# Patient Record
Sex: FEMALE | Race: WHITE | NOT HISPANIC OR LATINO | Employment: FULL TIME | URBAN - METROPOLITAN AREA
[De-identification: names, ages, dates, MRNs, and addresses within clinical notes are randomized per-mention and may not be internally consistent; named-entity substitution may affect disease eponyms.]

---

## 2023-10-26 PROBLEM — R31.29 MICROSCOPIC HEMATURIA: Status: ACTIVE | Noted: 2022-06-29

## 2023-10-26 PROBLEM — J18.9 PNEUMONIA: Status: ACTIVE | Noted: 2023-10-26

## 2023-10-26 PROBLEM — E78.2 MIXED HYPERLIPIDEMIA: Status: ACTIVE | Noted: 2018-06-17

## 2023-10-26 PROBLEM — E66.9 OBESITY: Status: ACTIVE | Noted: 2018-06-17

## 2023-10-26 PROBLEM — E03.9 HYPOTHYROIDISM: Status: ACTIVE | Noted: 2018-06-17

## 2023-10-26 PROBLEM — M54.10 RADICULOPATHY: Status: ACTIVE | Noted: 2020-08-20

## 2023-10-26 PROBLEM — B00.1 RECURRENT COLD SORES: Status: ACTIVE | Noted: 2022-10-26

## 2023-10-26 PROBLEM — R20.2 PARESTHESIA: Status: ACTIVE | Noted: 2023-10-26

## 2023-10-26 PROBLEM — F32.A DEPRESSION: Status: ACTIVE | Noted: 2019-10-01

## 2023-10-26 PROBLEM — M54.31 RIGHT SIDED SCIATICA: Status: ACTIVE | Noted: 2018-09-19

## 2023-10-26 PROBLEM — S90.229A SUBUNGUAL HEMATOMA OF TOE: Status: ACTIVE | Noted: 2019-10-01

## 2023-10-26 PROBLEM — R73.01 IMPAIRED FASTING GLUCOSE: Status: ACTIVE | Noted: 2022-12-14

## 2023-10-26 PROBLEM — E55.9 VITAMIN D DEFICIENCY, UNSPECIFIED: Status: ACTIVE | Noted: 2019-01-07

## 2023-10-26 PROBLEM — J01.00 ACUTE MAXILLARY SINUSITIS: Status: ACTIVE | Noted: 2019-03-26

## 2023-10-26 PROBLEM — R74.8 ELEVATED LIVER ENZYMES: Status: ACTIVE | Noted: 2019-09-20

## 2023-10-26 PROBLEM — M54.16 LUMBAR BACK PAIN WITH RADICULOPATHY AFFECTING RIGHT LOWER EXTREMITY: Status: ACTIVE | Noted: 2021-06-28

## 2023-10-26 PROBLEM — N39.3 SUI (STRESS URINARY INCONTINENCE, FEMALE): Status: ACTIVE | Noted: 2023-10-26

## 2023-10-26 PROBLEM — M17.12 PRIMARY OSTEOARTHRITIS OF LEFT KNEE: Status: ACTIVE | Noted: 2023-10-26

## 2023-10-26 PROBLEM — G47.19 EXCESSIVE DAYTIME SLEEPINESS: Status: ACTIVE | Noted: 2019-06-13

## 2023-10-26 PROBLEM — M25.512 LEFT SHOULDER PAIN: Status: ACTIVE | Noted: 2020-08-20

## 2023-10-26 PROBLEM — R07.89 ATYPICAL CHEST PAIN: Status: ACTIVE | Noted: 2021-06-28

## 2023-10-26 PROBLEM — G56.03 BILATERAL CARPAL TUNNEL SYNDROME: Status: ACTIVE | Noted: 2023-10-26

## 2023-10-26 RX ORDER — BUPROPION HYDROCHLORIDE 150 MG/1
150 TABLET ORAL DAILY
COMMUNITY
End: 2023-12-26 | Stop reason: SDUPTHER

## 2023-10-26 RX ORDER — SPIRONOLACTONE 50 MG/1
50 TABLET, FILM COATED ORAL 2 TIMES DAILY
COMMUNITY
Start: 2022-12-24

## 2023-10-26 RX ORDER — ATORVASTATIN CALCIUM 10 MG/1
10 TABLET, FILM COATED ORAL NIGHTLY
COMMUNITY
End: 2023-12-26 | Stop reason: SDUPTHER

## 2023-10-26 RX ORDER — FLUTICASONE PROPIONATE 50 MCG
2 SPRAY, SUSPENSION (ML) NASAL DAILY
COMMUNITY
Start: 2023-06-30

## 2023-10-26 RX ORDER — LEVOTHYROXINE SODIUM 25 UG/1
25 TABLET ORAL
COMMUNITY

## 2023-10-26 RX ORDER — METFORMIN HYDROCHLORIDE 500 MG/1
500 TABLET ORAL EVERY 24 HOURS
COMMUNITY

## 2023-10-26 RX ORDER — LEVOTHYROXINE SODIUM 50 UG/1
50 TABLET ORAL DAILY
COMMUNITY
End: 2023-12-26 | Stop reason: SDUPTHER

## 2023-10-26 RX ORDER — HYDROCORTISONE 25 MG/G
1 CREAM TOPICAL 2 TIMES DAILY
COMMUNITY
Start: 2022-06-15

## 2023-10-27 ENCOUNTER — LAB (OUTPATIENT)
Dept: LAB | Facility: LAB | Age: 33
End: 2023-10-27
Payer: MEDICAID

## 2023-10-27 ENCOUNTER — OFFICE VISIT (OUTPATIENT)
Dept: PRIMARY CARE | Facility: CLINIC | Age: 33
End: 2023-10-27
Payer: MEDICAID

## 2023-10-27 VITALS
DIASTOLIC BLOOD PRESSURE: 80 MMHG | TEMPERATURE: 97.3 F | BODY MASS INDEX: 52.62 KG/M2 | SYSTOLIC BLOOD PRESSURE: 126 MMHG | WEIGHT: 293 LBS | HEART RATE: 78 BPM | OXYGEN SATURATION: 98 %

## 2023-10-27 DIAGNOSIS — T78.1XXA ADVERSE FOOD REACTION, INITIAL ENCOUNTER: ICD-10-CM

## 2023-10-27 DIAGNOSIS — T78.1XXA ADVERSE FOOD REACTION, INITIAL ENCOUNTER: Primary | ICD-10-CM

## 2023-10-27 PROCEDURE — 86003 ALLG SPEC IGE CRUDE XTRC EA: CPT

## 2023-10-27 PROCEDURE — 1036F TOBACCO NON-USER: CPT | Performed by: NURSE PRACTITIONER

## 2023-10-27 PROCEDURE — 36415 COLL VENOUS BLD VENIPUNCTURE: CPT

## 2023-10-27 PROCEDURE — 99213 OFFICE O/P EST LOW 20 MIN: CPT | Performed by: NURSE PRACTITIONER

## 2023-10-27 NOTE — PATIENT INSTRUCTIONS
Thank you for choosing our office for your healthcare needs    Blood work is ordered to check for food allergies  You may still need to see an allergist.  Await results    Please avoid shellfish/crab products  Keep Benadryl on hand for use should reaction occur  If you feel like your throat is closing, Have tongue swelling or have difficulty breathing, Call 911 to go to ER

## 2023-10-27 NOTE — PROGRESS NOTES
Subjective   Patient ID: Usha Stevens is a 33 y.o. female who presents for Allergic Reaction (Few weeks ago went out and had crab. Face, lips, hands swelled. Felt like something was in throat.).    MICHELLE Kerr is a 33-year-old female who presents with food allergy concerns  Patient states she was eating crab approximately 2 to 3 weeks ago when she developed facial swelling, throat and tongue swelling, lip swelling and rash to her chest where crab touched her skin.She felt like something was stuck in her throat.   Patient states she went home and took a Benadryl with resolution of symptoms  The symptoms have not reoccurred since this event    She does not Eat crab often but has ate it in the past with no reactions.  Patient denies Known food allergies    Review of Systems  Review of systems negative today    Objective   /80   Pulse 78   Temp 36.3 °C (97.3 °F)   Wt 148 kg (326 lb)   SpO2 98%   BMI 52.62 kg/m²     Physical Exam  Alert and oriented x3, no acute distress  Oropharynx clear with moist mucous membranes. No drooling  Neck supple  Lungs clear to auscultation.  Breathing unlabored.  No stridor or wheezing.Speaks complete sentences  Heart with regular rate and rhythm  Skin warm and dry without rash  Neuro grossly intact    Assessment/Plan   Diagnoses and all orders for this visit:  Adverse food reaction, initial encounter  -     Food Allergy Profile IgE; Future  Patient with likely allergic reaction to crab  We will check food allergy panel  Advised patient she may still need to see allergist  She is instructed to avoid shellfish/crab containing products  Keep Benadryl with her at all times and take immediately should reaction occur  Await results for further follow-up

## 2023-10-28 LAB
CLAM IGE QN: <0.1 KU/L
CODFISH IGE QN: <0.1 KU/L
CORN IGE QN: <0.1
EGG WHITE IGE QN: 0.16 KU/L
MILK IGE QN: 0.14 KU/L
PEANUT IGE QN: <0.1 KU/L
SCALLOP IGE QN: <0.1 KU/L
SESAME SEED IGE QN: <0.1 KU/L
SHRIMP IGE QN: 0.13 KU/L
SOYBEAN IGE QN: <0.1 KU/L
WALNUT IGE QN: <0.1 KU/L
WHEAT IGE QN: <0.1 KU/L

## 2023-12-26 ENCOUNTER — TELEPHONE (OUTPATIENT)
Dept: PRIMARY CARE | Facility: CLINIC | Age: 33
End: 2023-12-26
Payer: MEDICAID

## 2023-12-26 DIAGNOSIS — E03.9 HYPOTHYROIDISM, UNSPECIFIED TYPE: ICD-10-CM

## 2023-12-26 DIAGNOSIS — F32.A DEPRESSION, UNSPECIFIED DEPRESSION TYPE: ICD-10-CM

## 2023-12-26 DIAGNOSIS — E78.2 MIXED HYPERLIPIDEMIA: ICD-10-CM

## 2023-12-26 RX ORDER — LEVOTHYROXINE SODIUM 50 UG/1
50 TABLET ORAL DAILY
Qty: 90 TABLET | Refills: 1 | Status: SHIPPED | OUTPATIENT
Start: 2023-12-26 | End: 2024-06-23

## 2023-12-26 RX ORDER — ATORVASTATIN CALCIUM 10 MG/1
10 TABLET, FILM COATED ORAL NIGHTLY
Qty: 90 TABLET | Refills: 1 | Status: SHIPPED | OUTPATIENT
Start: 2023-12-26 | End: 2024-06-23

## 2023-12-26 RX ORDER — BUPROPION HYDROCHLORIDE 150 MG/1
150 TABLET ORAL DAILY
Qty: 90 TABLET | Refills: 1 | Status: SHIPPED | OUTPATIENT
Start: 2023-12-26 | End: 2024-06-23

## 2023-12-26 NOTE — TELEPHONE ENCOUNTER
Patient needs refills on the following    Lipitor 10  Synthroid 50mcg  Wellbutril 150mg    [facility] in [place]

## 2024-01-15 ENCOUNTER — ANCILLARY PROCEDURE (OUTPATIENT)
Dept: RADIOLOGY | Facility: CLINIC | Age: 34
End: 2024-01-15
Payer: MEDICAID

## 2024-01-15 ENCOUNTER — OFFICE VISIT (OUTPATIENT)
Dept: PRIMARY CARE | Facility: CLINIC | Age: 34
End: 2024-01-15
Payer: MEDICAID

## 2024-01-15 VITALS
HEART RATE: 88 BPM | WEIGHT: 293 LBS | DIASTOLIC BLOOD PRESSURE: 80 MMHG | BODY MASS INDEX: 54.88 KG/M2 | TEMPERATURE: 97.8 F | SYSTOLIC BLOOD PRESSURE: 132 MMHG | OXYGEN SATURATION: 98 %

## 2024-01-15 DIAGNOSIS — J20.9 SUBACUTE BRONCHITIS: ICD-10-CM

## 2024-01-15 DIAGNOSIS — R05.3 PERSISTENT COUGH: Primary | ICD-10-CM

## 2024-01-15 DIAGNOSIS — R05.3 PERSISTENT COUGH: ICD-10-CM

## 2024-01-15 PROCEDURE — 71046 X-RAY EXAM CHEST 2 VIEWS: CPT

## 2024-01-15 PROCEDURE — 99213 OFFICE O/P EST LOW 20 MIN: CPT | Performed by: NURSE PRACTITIONER

## 2024-01-15 PROCEDURE — 1036F TOBACCO NON-USER: CPT | Performed by: NURSE PRACTITIONER

## 2024-01-15 RX ORDER — AMOXICILLIN 875 MG/1
875 TABLET, FILM COATED ORAL 2 TIMES DAILY
Qty: 20 TABLET | Refills: 0 | Status: SHIPPED | OUTPATIENT
Start: 2024-01-15 | End: 2024-01-25

## 2024-01-15 RX ORDER — ALBUTEROL SULFATE 90 UG/1
2 AEROSOL, METERED RESPIRATORY (INHALATION) EVERY 6 HOURS PRN
Qty: 8 G | Refills: 5 | Status: SHIPPED | OUTPATIENT
Start: 2024-01-15 | End: 2025-01-14

## 2024-01-15 ASSESSMENT — ENCOUNTER SYMPTOMS
SINUS PAIN: 0
COUGH: 1
WHEEZING: 0
SINUS PRESSURE: 0
NAUSEA: 0
SORE THROAT: 0
SHORTNESS OF BREATH: 0
CHILLS: 0
FEVER: 0
VOMITING: 0
RHINORRHEA: 0
DIARRHEA: 0

## 2024-01-15 NOTE — PROGRESS NOTES
Subjective   Patient ID: Usha Stevens is a 33 y.o. female who presents for Cough (Congestion. Off/on since November.).    Cough  Associated symptoms include chest pain, ear pain and postnasal drip. Pertinent negatives include no chills, fever, rash, rhinorrhea, sore throat, shortness of breath or wheezing.      Pt has had cough on and off since November  Has been seen in [facility] and given tessalon and prednisone with no relief  Claritin, mucinex, dayquil/nyquil  No imaging  Nonsmoker  No covid19 testing    Review of Systems   Constitutional:  Negative for chills and fever.   HENT:  Positive for congestion, ear pain, postnasal drip and sneezing. Negative for rhinorrhea, sinus pressure, sinus pain and sore throat.    Respiratory:  Positive for cough. Negative for shortness of breath and wheezing.    Cardiovascular:  Positive for chest pain.   Gastrointestinal:  Negative for diarrhea, nausea and vomiting.   Skin:  Negative for rash.       Objective   /80   Pulse 88   Temp 36.6 °C (97.8 °F)   Wt (!) 154 kg (340 lb)   SpO2 98%   BMI 54.88 kg/m²     Physical Exam  Gen: A/O x3 NAD  Eyes: WNL  ENT: Bilat TM dull. Auditory canals wnl. Bilat nares pale/boggy. + Posterior pharynx erythema. No exudate. Uvula midline. No sinus tenderness.  Lungs: Breath sounds clear with harsh cough. No wheeze. Speaks complete sentences/Unlabored.  Heart: RRR, no murmur  Neck: supple, no adenopathy  Abd: soft NT/ND, obese  Skin: warm/dry, no rash  Neuro: grossly intact    Assessment/Plan   Diagnoses and all orders for this visit:  Persistent cough  -     amoxicillin (Amoxil) 875 mg tablet; Take 1 tablet (875 mg) by mouth 2 times a day for 10 days.  -     albuterol (Ventolin HFA) 90 mcg/actuation inhaler; Inhale 2 puffs every 6 hours if needed for wheezing or shortness of breath (cough).  -     XR chest 2 views; Future  Subacute bronchitis    Medications as prescribed  Antibiotic due to length of sx  Differentials include   Cxr  ordered  Fluids, rest, humidifier  Flonase, claritin  Follow up if not improving 2 weeks

## 2024-02-07 ENCOUNTER — APPOINTMENT (OUTPATIENT)
Dept: ALLERGY | Facility: HOSPITAL | Age: 34
End: 2024-02-07
Payer: MEDICAID

## 2024-02-21 ENCOUNTER — APPOINTMENT (OUTPATIENT)
Dept: ALLERGY | Facility: HOSPITAL | Age: 34
End: 2024-02-21
Payer: MEDICAID

## 2024-02-21 ENCOUNTER — CONSULT (OUTPATIENT)
Dept: ALLERGY | Facility: HOSPITAL | Age: 34
End: 2024-02-21
Payer: MEDICAID

## 2024-02-21 VITALS
HEART RATE: 79 BPM | HEIGHT: 66 IN | TEMPERATURE: 98.3 F | BODY MASS INDEX: 47.09 KG/M2 | SYSTOLIC BLOOD PRESSURE: 131 MMHG | DIASTOLIC BLOOD PRESSURE: 82 MMHG | WEIGHT: 293 LBS

## 2024-02-21 DIAGNOSIS — J31.0 CHRONIC RHINITIS: ICD-10-CM

## 2024-02-21 DIAGNOSIS — T78.1XXA ADVERSE FOOD REACTION, INITIAL ENCOUNTER: Primary | ICD-10-CM

## 2024-02-21 PROCEDURE — 99214 OFFICE O/P EST MOD 30 MIN: CPT | Performed by: ALLERGY & IMMUNOLOGY

## 2024-02-21 PROCEDURE — 99204 OFFICE O/P NEW MOD 45 MIN: CPT | Performed by: ALLERGY & IMMUNOLOGY

## 2024-02-21 RX ORDER — AZITHROMYCIN 250 MG/1
TABLET, FILM COATED ORAL
COMMUNITY
Start: 2019-12-25

## 2024-02-21 RX ORDER — NORETHINDRONE ACETATE AND ETHINYL ESTRADIOL 1.5-30(21)
1 KIT ORAL
COMMUNITY
Start: 2011-07-25

## 2024-02-21 RX ORDER — FENOFIBRATE 145 MG/1
145 TABLET, FILM COATED ORAL
COMMUNITY

## 2024-02-21 RX ORDER — PREDNISONE 20 MG/1
40 TABLET ORAL DAILY
COMMUNITY
Start: 2023-12-05 | End: 2023-12-10

## 2024-02-21 RX ORDER — BENZONATATE 200 MG/1
CAPSULE ORAL
COMMUNITY
Start: 2023-12-05

## 2024-02-21 RX ORDER — METHOCARBAMOL 750 MG/1
750 TABLET, FILM COATED ORAL 4 TIMES DAILY
COMMUNITY
Start: 2019-12-25

## 2024-02-21 RX ORDER — DEXTROMETHORPHAN HBR. AND GUAIFENESIN 10; 100 MG/5ML; MG/5ML
5 SOLUTION ORAL 2 TIMES DAILY
COMMUNITY
Start: 2019-12-25

## 2024-02-21 RX ORDER — IBUPROFEN 800 MG/1
800 TABLET ORAL EVERY 6 HOURS PRN
COMMUNITY
Start: 2019-12-25

## 2024-02-21 ASSESSMENT — ENCOUNTER SYMPTOMS
CARDIOVASCULAR NEGATIVE: 1
RESPIRATORY NEGATIVE: 1
GASTROINTESTINAL NEGATIVE: 1
ALLERGIC/IMMUNOLOGIC NEGATIVE: 1
MUSCULOSKELETAL NEGATIVE: 1
HEMATOLOGIC/LYMPHATIC NEGATIVE: 1
EYES NEGATIVE: 1
CONSTITUTIONAL NEGATIVE: 1

## 2024-02-21 NOTE — PATIENT INSTRUCTIONS
It was nice to meet you today.    Please avoid all antihistamines for at least 7 days prior to next visit for allergy testing

## 2024-02-21 NOTE — PROGRESS NOTES
Usha Stevens presents for initial evaluation today.      Usha Stevens was seen at the request of ROMAN Dior for a chief complaint of concern for allergy; a report with my findings is being sent via written or electronic means to ROMAN Dior with my recommendations for treatment    She provides the following history:    She went out to dinner for her birthday and had one pound of crab.  She noted palms of her hands were itching correction through eating the crab and then she developed lip swelling and throat tightness.  She took Benadryl when she got home.  She notes that symptoms improved within an hour.  She denies drinking any alcohol with the meal.  She otherwise had potatoes, bread, and salad.      She last ate crab in March 2023 without issue.  Since then she has eaten lobster and shrimp.  She notes that eating lobster caused facial itching, so she stopped eating it.  She tolerated shrimp as recently as last week with no problem (peel and eat).    She tolerates finned fish with no problem.  Eats egg, milk, wheat, soy, peanut, tree nuts, sesame with no problem.      Rhinitis: She reports itchy eyes, runny nose, sneezing, nasal congestion during summer and when around pine [place] trees.  She takes Claritin daily, and has Flonase but does not use it regularly.      Asthma: Diagnosed with asthma in 7th grade with exercise-induced.  Albuterol prn.  Uses albuterol couple times a year at most.     Eczema: Denies    Venom allergy:  Denies     Drug allergy: Denies     Environmental History:  Type of home:  House  Pets in the house: Cat (7)   Mold or moisture in the home: None  Bedroom lisette: Hardwood  Dust mite covers on bed:  No  Cigarette exposure in the home:  No  Occupation/School: works as medical transport advisor, works in office, older building     Pertinent Allergy/Immunology family history:  Mom with shellfish and seasonal allergies     Review of Systems  "  Constitutional: Negative.    HENT: Negative.     Eyes: Negative.    Respiratory: Negative.     Cardiovascular: Negative.    Gastrointestinal: Negative.    Musculoskeletal: Negative.    Skin: Negative.    Allergic/Immunologic: Negative.    Hematological: Negative.        Vital signs:  /82 (BP Location: Right arm, Patient Position: Sitting)   Pulse 79   Temp 36.8 °C (98.3 °F) (Oral)   Ht 1.676 m (5' 5.98\")   Wt (!) 152 kg (335 lb 8.6 oz)   BMI 54.18 kg/m²     Physical Exam:  GENERAL: Alert, oriented and in no acute distress.     HEENT: EYES: No conjunctival injection or cobblestoning. Nose: nasal turbinates mildly edematous and are not boggy.  There is no mucous stranding, polyps, or blood    noted. EARS: Tympanic membranes are clear. MOUTH: moist and pink with no exudates, ulcers, or thrush. NECK: is supple, without adenopathy.  No upper airway stridor noted.       HEART: regular rate and rhythm.       LUNGS: Clear to auscultation bilaterally. No wheezing, rhonchi or rales.        ABDOMEN: Positive bowel sounds, soft, nontender, nondistended.       EXTREMITIES: No clubbing or edema.        NEURO:  Normal affect.  Gait normal.      SKIN: No rash, hives, or angioedema noted      Impression:  1. Adverse food reaction, initial encounter    2. Chronic rhinitis      Assessment and Plan:  Adverse reaction to food, initial encounter: Recent clinical reaction to crab and lobster.  She had a food allergy profile that showed equivocal IgE to egg, milk, shrimp which she tolerates with no problem.  We discussed that positive food allergy specific IgE only indicates sensitization, not clinical food allergy.  She tolerates ingesting these foods without issue making food allergy unlikely to egg, milk, shrimp.  Will further evaluate for other shellfish allergy given reaction to crab and lobster (which was not assessed on food allergy IgE panel).  She will return for allergy testing off antihistamines in 1 " week.    Chronic rhinitis: We will further evaluate with environmental allergy skin prick testing.  Will make further recommendations pending results of testing.

## 2024-02-28 ENCOUNTER — OFFICE VISIT (OUTPATIENT)
Dept: ALLERGY | Facility: HOSPITAL | Age: 34
End: 2024-02-28
Payer: MEDICAID

## 2024-02-28 ENCOUNTER — LAB (OUTPATIENT)
Dept: LAB | Facility: LAB | Age: 34
End: 2024-02-28
Payer: MEDICAID

## 2024-02-28 VITALS
TEMPERATURE: 98.5 F | WEIGHT: 293 LBS | BODY MASS INDEX: 47.09 KG/M2 | HEIGHT: 66 IN | HEART RATE: 82 BPM | SYSTOLIC BLOOD PRESSURE: 134 MMHG | DIASTOLIC BLOOD PRESSURE: 85 MMHG

## 2024-02-28 DIAGNOSIS — T78.1XXD ADVERSE FOOD REACTION, SUBSEQUENT ENCOUNTER: ICD-10-CM

## 2024-02-28 DIAGNOSIS — J30.89 ALLERGIC RHINITIS DUE TO DUST MITE: ICD-10-CM

## 2024-02-28 DIAGNOSIS — J30.81 ALLERGIC RHINITIS DUE TO ANIMAL DANDER: ICD-10-CM

## 2024-02-28 DIAGNOSIS — J30.1 SEASONAL ALLERGIC RHINITIS DUE TO POLLEN: ICD-10-CM

## 2024-02-28 DIAGNOSIS — T78.1XXD ADVERSE FOOD REACTION, SUBSEQUENT ENCOUNTER: Primary | ICD-10-CM

## 2024-02-28 PROCEDURE — 82785 ASSAY OF IGE: CPT

## 2024-02-28 PROCEDURE — 95004 PERQ TESTS W/ALRGNC XTRCS: CPT | Performed by: ALLERGY & IMMUNOLOGY

## 2024-02-28 PROCEDURE — 86003 ALLG SPEC IGE CRUDE XTRC EA: CPT

## 2024-02-28 PROCEDURE — 36415 COLL VENOUS BLD VENIPUNCTURE: CPT

## 2024-02-28 PROCEDURE — 1036F TOBACCO NON-USER: CPT | Performed by: ALLERGY & IMMUNOLOGY

## 2024-02-28 PROCEDURE — PERCT PERCUT ALLERGY SKIN TEST: Performed by: ALLERGY & IMMUNOLOGY

## 2024-02-28 RX ORDER — CETIRIZINE HYDROCHLORIDE 10 MG/1
10 TABLET ORAL DAILY PRN
Qty: 30 TABLET | Refills: 11 | Status: SHIPPED | OUTPATIENT
Start: 2024-02-28 | End: 2025-02-27

## 2024-02-28 RX ORDER — FLUTICASONE PROPIONATE 50 MCG
2 SPRAY, SUSPENSION (ML) NASAL DAILY
Qty: 16 G | Refills: 11 | Status: SHIPPED | OUTPATIENT
Start: 2024-02-28 | End: 2025-02-27

## 2024-02-28 ASSESSMENT — ENCOUNTER SYMPTOMS
OCCASIONAL FEELINGS OF UNSTEADINESS: 0
DEPRESSION: 0
ALLERGIC/IMMUNOLOGIC NEGATIVE: 1
LOSS OF SENSATION IN FEET: 0
EYES NEGATIVE: 1
GASTROINTESTINAL NEGATIVE: 1
CARDIOVASCULAR NEGATIVE: 1
CONSTITUTIONAL NEGATIVE: 1
MUSCULOSKELETAL NEGATIVE: 1
RESPIRATORY NEGATIVE: 1
HEMATOLOGIC/LYMPHATIC NEGATIVE: 1

## 2024-02-28 NOTE — PATIENT INSTRUCTIONS
Your allergy testing was positive to tree and weed pollen, cat, mold, dust mite    Environmental Allergy Testing:  Test Results (wheal/flare in mm)    Controls  Controls  Histamine:   Negative: 0/0    Trees:  Trees  American Beech: 0/0  Julito: 0/0  Birch: 0/0  Black [place]: 0/0  [place]: 3/5  [place]: 4/5  [place]: 0/0  Elm:   [place]: /5  Maple: 0/0  [place]: 0/0  [place]: 0/0  [place]: 0/0  White poplar: 0/0     Grasses:  Grass  [place]: 0/0  Bermuda: 0/0  [place]: 0/0  KORT Grass Mix: 0/0  Sweet Vernal: 0/0    Weeds:  Weeds  Cocklebur: 0/0  Dandelion: 0/0  English Plantain: 0/0  Goldenrod: 5/10  Lambs Quarters: 0/0  Mugwort:   Pigweed: 0/0  Ragweed Mix: 0/0  Russian Thistle: 0/0  Yellow Dock: 0/0     Molds:  Molds  Alternaria: 0/0  Aspergillus: 0/0  Cladosporium: 0/0  Helminthosporium: 0/0  Penicillium: 3/5  Stemphyllium: 3/5    Animals/Dust Mite:  Animals  Cat: 3/5  AP do/0  Dasilva Do/0  Mouse: 0/0  Cockroach: 0/0  Dust Mite F: 4/10  Dust Mite P: 3/20     Food Allergy Test Results  Shellfish  Clam: 0/0  Crab: 0/0  Lobster: 0/0  Shrimp: 0/0         You may use Flonase 2 sprays each nostril once daily  Technique for nasal spray administration:  First, look slightly down, breathe normally, you do not need to sniff while you spray.  To use the nose spray, place the tip in your nose with the opposite hand (left hand, right side of nose, right hand, left side of nose), and aim or point toward the outside of the nose.  Do not sniff or snort medication afterwards as this can cause most of the medication to be swallowed.  Rather, dab your nose with a tissue if any runs out.    You may use Cetirizine (Zyrtec) 10 mg once daily as needed     Please have labs drawn. Please note that some labs will come back sooner than others.  We will contact you when all labs have returned so that we can discuss results.     We would like to see you in follow up in 6 months or sooner if needed

## 2024-02-28 NOTE — LETTER
February 28, 2024     Patient: Usha Stevens   YOB: 1990   Date of Visit: 2/28/2024       To Whom It May Concern:    Usha Stevens was seen in my clinic on 2/28/2024 at 12:30 pm. Please excuse Usha for her absence from school on this day to make the appointment.    If you have any questions or concerns, please don't hesitate to call.         Sincerely,         Princess MARY Stephens MD        CC:   No Recipients

## 2024-02-28 NOTE — PROGRESS NOTES
"Usha Stevens presents for allergy testing.  She has no new complaints.   She has not had reactions since last visit.  She has 6 cats at home.       Review of Systems   Constitutional: Negative.    HENT: Negative.     Eyes: Negative.    Respiratory: Negative.     Cardiovascular: Negative.    Gastrointestinal: Negative.    Musculoskeletal: Negative.    Skin: Negative.    Allergic/Immunologic: Negative.    Hematological: Negative.        Vital signs:  /85 (BP Location: Right arm, Patient Position: Sitting)   Pulse 82   Temp 36.9 °C (98.5 °F) (Oral)   Ht 1.67 m (5' 5.75\")   Wt (!) 151 kg (332 lb 14.3 oz)   BMI 54.14 kg/m²     Physical Exam:    GENERAL: Alert, oriented and in no acute distress.     LUNGS: No wheezing    SKIN: No rash, hives, or angioedema noted    Environmental Allergy Testing:  Test Results (wheal/flare in mm)    Controls  Controls  Histamine: 30  Negative: 0/0    Trees:  Trees  American Beech: 0/0  Julito: 0/0  Birch: 0/0  Black [place]: 0/0  [place]: 3/5  [place]: 4/5  [place]astern [place]: 0/0  Elm: /20  [place]: 4/5  Maple: 0/0  [place]: 0/0  [place]: 0/0  [place]: 0/0  White poplar: 0/0     Grasses:  Grass  Bahia: 0/0  [place]: 0/0  [place]: 0/0  KORT Grass Mix: 0/0  Sweet Vernal: 0/0    Weeds:  Weeds  Cocklebur: 0/0  Dandelion: 0/0  English Plantain: 0/0  Goldenrod: 5/10  Lambs Quarters: 0/0  Mugwort: 4/20  Pigweed: 0/0  Ragweed Mix: 0/0  Russian Thistle: 0/0  Yellow Dock: 0/0     Molds:  Molds  Alternaria: 0/0  Aspergillus: 0/0  Cladosporium: 0/0  Helminthosporium: 0/0  Penicillium: 3/5  Stemphyllium: 3/5    Animals/Dust Mite:  Animals  Cat: 3/5  AP do/0  Dasilva Do/0  Mouse: 0/0  Cockroach: 0/0  Dust Mite F: 4/10  Dust Mite P: 3/20     Food Allergy Test Results  Shellfish  Clam: 0/0  Crab: 0/0  Lobster: 0/0  Shrimp: 0/0         Impression:  1. Adverse food reaction, subsequent encounter    2. Seasonal allergic rhinitis due to pollen    3. Allergic rhinitis due to dust mite  "   4. Allergic rhinitis due to animal dander        Assessment and Plan:  Adverse reaction to food, subsequent encounter: Recent clinical reaction thought to be secondary to crab and lobster.  Allergy testing to shellfish was negative today.  Will further evaluate with shellfish specific IgE to crab, lobster, mussel, oyster and make further recommendations pending results of testing.  Discussed that dust mite tropomyosin cross-reactivity may be a possible contributor to symptoms.  Recommended if reaction recurs, please keep diary of exposures.     Allergic rhinitis, seasonal and perennial: She was found to have significant sensitivity to tree, weed, dust mite, cat on aeroallergen skin prick testing today.  We discussed treatments for allergic rhinitis to include avoidance, medication, and allergen immunotherapy.  We discussed interventions for dust mite control, and provided  information on dust mite encasements for the bedding.  Recommend Flonase 2 sprays each nostril once daily and cetirizine 10 mg once daily as needed.  We reviewed proper nasal spray administration technique.  If she does not achieve adequate control with medication and/or would like to decrease overall medication use, allergen immunotherapy would be an option to consider in the future.    Plan for follow up in 3 months or sooner if needed

## 2024-02-29 LAB
CRAB IGE QN: <0.1 KU/L
LOBSTER IGE QN: <0.1 KU/L
OYSTER IGE QN: <0.1 KU/L
TOTAL IGE SMQN RAST: 372 KU/L

## 2024-03-02 ENCOUNTER — TELEPHONE (OUTPATIENT)
Dept: ALLERGY | Facility: CLINIC | Age: 34
End: 2024-03-02
Payer: MEDICAID

## 2024-03-02 LAB
ANNOTATION COMMENT IMP: NORMAL
BLUE MUSSEL IGE QN: <0.1 KU/L

## 2024-03-02 NOTE — TELEPHONE ENCOUNTER
Please let patient know that shellfish allergy testing is negative.  If symptoms recur, please keep diary of closures and let us know.

## 2024-03-04 NOTE — TELEPHONE ENCOUNTER
Result Communication    Resulted Orders   Lobster IgE   Result Value Ref Range    Lobster IgE <0.10 <0.10 kU/L    Narrative    Interpretation Scale  <0.10 kU/L - Class 0 Allergen: ABSENT OR UNDETECTABLE ALLERGEN SPECIFIC IgE  0.10-0.34 kU/L - Class 0/1 Allergen: EQUIVOCAL LEVEL OF ALLERGEN SPECIFIC IgE  0.35-0.69 kU/L - Class 1 Allergen: LOW LEVEL OF ALLERGEN SPECIFIC IgE  0.70-3.49 kU/L - Class 2 Allergen: MODERATE LEVEL OF ALLERGEN SPECIFIC IgE  3.50-17.49 kU/L - Class 3 Allergen: HIGH LEVEL OF ALLERGEN SPECIFIC IgE  17.50-49.99 kU/L - Class 4 Allergen: VERY HIGH LEVEL OF ALLERGEN SPECIFIC IgE  50..00 kU/L - Class 5 Allergen: ULTRA HIGH LEVEL OF ALLERGEN SPECIFIC IgE  >100.00 kU/L - Class 6 Allergen: EXTREMELY HIGH LEVEL OF ALLERGEN SPECIFIC IgE   Crab IgE   Result Value Ref Range    Crab IgE <0.10 <0.10 kU/L    Narrative    Interpretation Scale  <0.10 kU/L - Class 0 Allergen: ABSENT OR UNDETECTABLE ALLERGEN SPECIFIC IgE  0.10-0.34 kU/L - Class 0/1 Allergen: EQUIVOCAL LEVEL OF ALLERGEN SPECIFIC IgE  0.35-0.69 kU/L - Class 1 Allergen: LOW LEVEL OF ALLERGEN SPECIFIC IgE  0.70-3.49 kU/L - Class 2 Allergen: MODERATE LEVEL OF ALLERGEN SPECIFIC IgE  3.50-17.49 kU/L - Class 3 Allergen: HIGH LEVEL OF ALLERGEN SPECIFIC IgE  17.50-49.99 kU/L - Class 4 Allergen: VERY HIGH LEVEL OF ALLERGEN SPECIFIC IgE  50..00 kU/L - Class 5 Allergen: ULTRA HIGH LEVEL OF ALLERGEN SPECIFIC IgE  >100.00 kU/L - Class 6 Allergen: EXTREMELY HIGH LEVEL OF ALLERGEN SPECIFIC IgE   Oyster IgE   Result Value Ref Range    Oyster IgE <0.10 <0.10 kU/L    Narrative    Interpretation Scale  <0.10 kU/L - Class 0 Allergen: ABSENT OR UNDETECTABLE ALLERGEN SPECIFIC IgE  0.10-0.34 kU/L - Class 0/1 Allergen: EQUIVOCAL LEVEL OF ALLERGEN SPECIFIC IgE  0.35-0.69 kU/L - Class 1 Allergen: LOW LEVEL OF ALLERGEN SPECIFIC IgE  0.70-3.49 kU/L - Class 2 Allergen: MODERATE LEVEL OF ALLERGEN SPECIFIC IgE  3.50-17.49 kU/L - Class 3 Allergen: HIGH LEVEL OF  ALLERGEN SPECIFIC IgE  17.50-49.99 kU/L - Class 4 Allergen: VERY HIGH LEVEL OF ALLERGEN SPECIFIC IgE  50..00 kU/L - Class 5 Allergen: ULTRA HIGH LEVEL OF ALLERGEN SPECIFIC IgE  >100.00 kU/L - Class 6 Allergen: EXTREMELY HIGH LEVEL OF ALLERGEN SPECIFIC IgE   Mussel IgE   Result Value Ref Range    Blue Mussel IgE <0.10 <=0.34 kU/L      Comment:      Performed By: [facility]  [address] [place] [id number]  : Dima Lowry MD, PhD  CLIA Number: [id number]   Immunocap IgE   Result Value Ref Range    Immunocap IgE 372 (H) <=214 KU/L      Comment:      Note: Omalizumab (Xolair, [employer]; humanized  IgG1 antihuman IgE Fc) treatment does not  significantly interfere with the accuracy of  total IgE on the ImmunoCAP (Enmotus) platform.  J Allergy Clin Immunol 2006;117:759-66).  Allergens, parasitic diseases, smoking, and  alcohol consumption have been reported to  increase levels of total IgE in serum.   Allergen Interpretation, Immunocap Score IgE   Result Value Ref Range    Immunocap Interpretation See Note       Comment:      REFERENCE INTERVAL: Allergen, Interpretation     Less than 0.10 kU/L......Class 0.....No significant level detected   0.10-0.34 kU/L...........Class 0/1...Clinical relevance   undetermined   0.35-0.70 kU/L...........Class 1.....Low   0.71-3.50 kU/L...........Class 2.....Moderate   3.51-17.50 kU/L..........Class 3.....High   17.51-50.00 kU/L.........Class 4.....Very High   50..00 kU/L........Class 5.....Very High   Greater than 100.00kU/L..Class 6.....Very High    Allergen results of 0.10-0.34 kU/L are intended for specialist use   as the clinical relevance is undetermined. Even though increasing   ranges are reflective of increasing concentrations of   allergen-specific IgE, these concentrations may not correlate with   the degree of clinical response or skin testing results when   challenged with a specific allergen. The correlation of allergy    laboratory results with clinical history and in vivo reactivity to   specific allergens is essential. A negative test may not rule out    clinical allergy or even anaphylaxis.  Performed By: [facility]  [address] [place] [id number]  : Dima Lowry MD, PhD  CLIA Number: [id number]       9:03 AM      Results were successfully communicated with the patient and they acknowledged their understanding.

## 2024-07-03 ENCOUNTER — APPOINTMENT (OUTPATIENT)
Dept: PRIMARY CARE | Facility: CLINIC | Age: 34
End: 2024-07-03
Payer: MEDICAID

## 2024-07-09 ENCOUNTER — OFFICE VISIT (OUTPATIENT)
Dept: OBSTETRICS AND GYNECOLOGY | Facility: CLINIC | Age: 34
End: 2024-07-09
Payer: MEDICAID

## 2024-07-09 VITALS
SYSTOLIC BLOOD PRESSURE: 122 MMHG | DIASTOLIC BLOOD PRESSURE: 88 MMHG | WEIGHT: 293 LBS | HEIGHT: 66 IN | BODY MASS INDEX: 47.09 KG/M2

## 2024-07-09 DIAGNOSIS — Z01.419 WELL WOMAN EXAM WITH ROUTINE GYNECOLOGICAL EXAM: Primary | ICD-10-CM

## 2024-07-09 PROCEDURE — 99395 PREV VISIT EST AGE 18-39: CPT | Performed by: OBSTETRICS & GYNECOLOGY

## 2024-07-09 ASSESSMENT — ENCOUNTER SYMPTOMS
DEPRESSION: 0
OCCASIONAL FEELINGS OF UNSTEADINESS: 0
LOSS OF SENSATION IN FEET: 0

## 2024-07-09 ASSESSMENT — PATIENT HEALTH QUESTIONNAIRE - PHQ9
1. LITTLE INTEREST OR PLEASURE IN DOING THINGS: NOT AT ALL
2. FEELING DOWN, DEPRESSED OR HOPELESS: NOT AT ALL
SUM OF ALL RESPONSES TO PHQ9 QUESTIONS 1 AND 2: 0

## 2024-07-09 ASSESSMENT — PAIN SCALES - GENERAL: PAINLEVEL: 0-NO PAIN

## 2024-07-09 NOTE — PROGRESS NOTES
Subjective   Usha Stevens is a 33 y.o. female who is here for a routine exam. Periods are regular every 28-30 days, lasting a few days. Dysmenorrhea:none. Cyclic symptoms include none. No intermenstrual bleeding, spotting, or discharge.      Last pap:   :neg/neg  Last mammogram  Current contraception: IUD  History of abnormal Pap smear: no  Family history of uterine or ovarian cancer: no  Regular self breast exam: yes  History of abnormal mammogram: no  Family history of breast cancer: no  History of abnormal lipids: no  Menstrual History:  OB History          1    Para   1    Term   1            AB        Living   1         SAB        IAB        Ectopic        Multiple        Live Births                    M  No LMP recorded. Patient has had an implant.         History reviewed. No pertinent past medical history.    History reviewed. No pertinent surgical history.    Social History     Socioeconomic History    Marital status:      Spouse name: Not on file    Number of children: Not on file    Years of education: Not on file    Highest education level: Not on file   Occupational History    Not on file   Tobacco Use    Smoking status: Never    Smokeless tobacco: Never   Vaping Use    Vaping status: Never Used   Substance and Sexual Activity    Alcohol use: Yes    Drug use: Not Currently    Sexual activity: Yes   Other Topics Concern    Not on file   Social History Narrative    Not on file     Social Determinants of Health     Financial Resource Strain: Not on file   Food Insecurity: Not on file   Transportation Needs: Not on file   Physical Activity: Not on file   Stress: Not on file   Social Connections: Not on file   Intimate Partner Violence: Not on file   Housing Stability: Not on file       No family history on file.    Prior to Admission medications    Medication Sig Start Date End Date Taking? Authorizing Provider   albuterol (Ventolin HFA) 90 mcg/actuation inhaler Inhale 2  puffs every 6 hours if needed for wheezing or shortness of breath (cough). 1/15/24 1/14/25  Ernestina G Biddell, APRN-CNP   atorvastatin (Lipitor) 10 mg tablet Take 1 tablet (10 mg) by mouth once daily at bedtime. 12/26/23 6/23/24  Ernestina G Biddell, APRN-CNP   buPROPion XL (Wellbutrin XL) 150 mg 24 hr tablet Take 1 tablet (150 mg) by mouth once daily. 12/26/23 6/23/24  Ernestina G Biddell, APRN-CNP   cetirizine (ZyrTEC) 10 mg tablet Take 1 tablet (10 mg) by mouth once daily as needed for allergies. 2/28/24 2/27/25  Princess MARY Stephens MD   fenofibrate (Tricor) 145 mg tablet Take 1 tablet (145 mg) by mouth once daily.    Historical Provider, MD   fluticasone (Flonase) 50 mcg/actuation nasal spray Administer 2 sprays into affected nostril(s) once daily. 6/30/23   Historical Provider, MD   fluticasone (Flonase) 50 mcg/actuation nasal spray Administer 2 sprays into each nostril once daily. Shake gently. Before first use, prime pump. After use, clean tip and replace cap. 2/28/24 2/27/25  Princess MARY Stephens MD   hydrocortisone (Anusol-HC) 2.5 % rectal cream 1 Application 2 times a day. 6/15/22   Historical Provider, MD   ibuprofen 800 mg tablet Take 1 tablet (800 mg) by mouth every 6 hours if needed. 12/25/19   Historical Provider, MD   levonorgestrel (MIRENA UTRN) intrauteral, once, 0 Refill(s), Type: Maintenance 6/13/18   Historical Provider, MD   levothyroxine (Synthroid, Levoxyl) 25 mcg tablet Take 1 tablet (25 mcg) by mouth once daily in the morning. Take before meals.    Historical Provider, MD   levothyroxine (Synthroid, Levoxyl) 50 mcg tablet Take 1 tablet (50 mcg) by mouth once daily. 12/26/23 6/23/24  Ernestina G Biddell, APRN-CNP   azithromycin (Zithromax) 250 mg tablet TAKE TWO (2) PILLS BY MOUTH THE FIRST DAY AND THEN ONE (1)  PILL BY MOUTH DAILY FOR FOUR (4) DAYS. 12/25/19 7/9/24  Historical Provider, MD   benzonatate (Tessalon) 200 mg capsule Take 1 Capsule (oral) 3 times per day for 7 days 12/5/23 7/9/24   "Historical Provider, MD   dextromethorphan-guaifenesin  mg/5 mL oral liquid Take 5 mL by mouth twice a day. 12/25/19 7/9/24  Historical Provider, MD   metFORMIN (Glucophage) 500 mg tablet Take 1 tablet (500 mg) by mouth once every 24 hours.  7/9/24  Historical Provider, MD   methocarbamol (Robaxin) 750 mg tablet Take 1 tablet (750 mg) by mouth 4 times a day. 12/25/19 7/9/24  Historical Provider, MD   norethindrone-e.estradioL-iron (Microgestin FE 1.5/30) 1.5 mg-30 mcg (21)/75 mg (7) tablet Take 1 tablet by mouth once daily. 7/25/11 7/9/24  Historical Provider, MD   spironolactone (Aldactone) 50 mg tablet Take 1 tablet (50 mg) by mouth 2 times a day. 12/24/22 7/9/24  Historical Provider, MD       Allergies   Allergen Reactions    Citric Acid Unknown              Objective   /88   Ht 1.676 m (5' 6\")   Wt (!) 153 kg (338 lb 3.2 oz)   BMI 54.59 kg/m²     Physical Exam  Vitals and nursing note reviewed.   Constitutional:       General: She is not in acute distress.     Appearance: Normal appearance. She is not ill-appearing.   HENT:      Head: Normocephalic and atraumatic.      Mouth/Throat:      Mouth: Mucous membranes are moist.      Pharynx: Oropharynx is clear.   Eyes:      Extraocular Movements: Extraocular movements intact.      Conjunctiva/sclera: Conjunctivae normal.      Pupils: Pupils are equal, round, and reactive to light.   Neck:      Thyroid: No thyroid mass, thyromegaly or thyroid tenderness.   Cardiovascular:      Rate and Rhythm: Normal rate and regular rhythm.      Pulses: Normal pulses.      Heart sounds: Normal heart sounds. No murmur heard.  Pulmonary:      Effort: Pulmonary effort is normal.      Breath sounds: No wheezing or rhonchi.   Chest:   Breasts:     Right: Normal. No swelling, bleeding, inverted nipple, mass, nipple discharge, skin change or tenderness.      Left: Normal. No swelling, bleeding, inverted nipple, mass, nipple discharge, skin change or tenderness.   Abdominal: "      General: Bowel sounds are normal. There is no distension.      Palpations: There is no mass.      Tenderness: There is no abdominal tenderness. There is no guarding or rebound.      Hernia: No hernia is present. There is no hernia in the left inguinal area or right inguinal area.   Genitourinary:     General: Normal vulva.      Pubic Area: No rash.       Labia:         Right: No rash, tenderness, lesion or injury.         Left: No rash, tenderness, lesion or injury.       Urethra: No prolapse or urethral swelling.      Vagina: No signs of injury. No vaginal discharge, tenderness or prolapsed vaginal walls.      Cervix: No cervical motion tenderness, discharge, friability, lesion, erythema or cervical bleeding.      Uterus: Not deviated, not enlarged, not fixed, not tender and no uterine prolapse.       Adnexa:         Right: No mass, tenderness or fullness.          Left: No mass, tenderness or fullness.     Musculoskeletal:         General: No swelling, tenderness, deformity or signs of injury. Normal range of motion.      Cervical back: No rigidity.   Lymphadenopathy:      Cervical: No cervical adenopathy.      Upper Body:      Right upper body: No axillary adenopathy.      Left upper body: No axillary adenopathy.      Lower Body: No right inguinal adenopathy. No left inguinal adenopathy.   Skin:     General: Skin is warm.      Findings: No lesion or rash.   Neurological:      General: No focal deficit present.      Mental Status: She is alert and oriented to person, place, and time.   Psychiatric:         Mood and Affect: Mood normal.         Behavior: Behavior normal.         Thought Content: Thought content normal.         Judgment: Judgment normal.         Assessment    Problem List Items Addressed This Visit    None       No follow-ups on file.   All questions answered.  Breast self exam technique reviewed and patient encouraged to perform self-exam monthly.  Diagnosis explained in detail, including  differential.  Discussed healthy lifestyle modifications..

## 2024-08-21 ENCOUNTER — APPOINTMENT (OUTPATIENT)
Dept: PRIMARY CARE | Facility: CLINIC | Age: 34
End: 2024-08-21
Payer: MEDICAID

## 2024-08-23 ENCOUNTER — TELEPHONE (OUTPATIENT)
Dept: PRIMARY CARE | Facility: CLINIC | Age: 34
End: 2024-08-23
Payer: MEDICAID

## 2024-08-23 DIAGNOSIS — R73.01 IMPAIRED FASTING GLUCOSE: ICD-10-CM

## 2024-08-23 DIAGNOSIS — E03.9 HYPOTHYROIDISM, UNSPECIFIED TYPE: ICD-10-CM

## 2024-08-23 DIAGNOSIS — E78.2 MIXED HYPERLIPIDEMIA: ICD-10-CM

## 2024-08-23 DIAGNOSIS — F32.A DEPRESSION, UNSPECIFIED DEPRESSION TYPE: ICD-10-CM

## 2024-08-23 DIAGNOSIS — Z00.00 ROUTINE GENERAL MEDICAL EXAMINATION AT A HEALTH CARE FACILITY: ICD-10-CM

## 2024-08-23 DIAGNOSIS — E55.9 VITAMIN D DEFICIENCY, UNSPECIFIED: ICD-10-CM

## 2024-08-23 RX ORDER — LEVOTHYROXINE SODIUM 50 UG/1
50 TABLET ORAL DAILY
Qty: 90 TABLET | Refills: 0 | Status: SHIPPED | OUTPATIENT
Start: 2024-08-23 | End: 2025-02-19

## 2024-08-23 RX ORDER — ATORVASTATIN CALCIUM 10 MG/1
10 TABLET, FILM COATED ORAL NIGHTLY
Qty: 90 TABLET | Refills: 0 | Status: SHIPPED | OUTPATIENT
Start: 2024-08-23 | End: 2025-02-19

## 2024-08-23 RX ORDER — BUPROPION HYDROCHLORIDE 150 MG/1
150 TABLET ORAL DAILY
Qty: 90 TABLET | Refills: 0 | Status: SHIPPED | OUTPATIENT
Start: 2024-08-23 | End: 2025-02-19

## 2024-09-20 ENCOUNTER — APPOINTMENT (OUTPATIENT)
Dept: PRIMARY CARE | Facility: CLINIC | Age: 34
End: 2024-09-20
Payer: MEDICAID

## 2024-10-13 ASSESSMENT — PROMIS GLOBAL HEALTH SCALE
CARRYOUT_PHYSICAL_ACTIVITIES: COMPLETELY
RATE_QUALITY_OF_LIFE: EXCELLENT
RATE_AVERAGE_FATIGUE: MODERATE
RATE_SOCIAL_SATISFACTION: EXCELLENT
RATE_AVERAGE_PAIN: 2
RATE_PHYSICAL_HEALTH: FAIR
RATE_MENTAL_HEALTH: VERY GOOD
CARRYOUT_SOCIAL_ACTIVITIES: VERY GOOD
RATE_QUALITY_OF_LIFE: EXCELLENT
RATE_GENERAL_HEALTH: GOOD
EMOTIONAL_PROBLEMS: SOMETIMES
RATE_AVERAGE_PAIN: 2
RATE_GENERAL_HEALTH: GOOD
CARRYOUT_SOCIAL_ACTIVITIES: VERY GOOD
EMOTIONAL_PROBLEMS: SOMETIMES
RATE_SOCIAL_SATISFACTION: EXCELLENT
RATE_PHYSICAL_HEALTH: FAIR
RATE_MENTAL_HEALTH: VERY GOOD
CARRYOUT_PHYSICAL_ACTIVITIES: COMPLETELY
RATE_AVERAGE_FATIGUE: MODERATE

## 2024-10-18 ENCOUNTER — APPOINTMENT (OUTPATIENT)
Dept: PRIMARY CARE | Facility: CLINIC | Age: 34
End: 2024-10-18
Payer: MEDICAID

## 2024-11-04 ENCOUNTER — APPOINTMENT (OUTPATIENT)
Dept: PRIMARY CARE | Facility: CLINIC | Age: 34
End: 2024-11-04
Payer: MEDICAID

## 2024-11-11 ENCOUNTER — APPOINTMENT (OUTPATIENT)
Dept: PRIMARY CARE | Facility: CLINIC | Age: 34
End: 2024-11-11
Payer: MEDICAID

## 2024-11-26 ENCOUNTER — TELEPHONE (OUTPATIENT)
Dept: PRIMARY CARE | Facility: CLINIC | Age: 34
End: 2024-11-26
Payer: MEDICAID

## 2024-11-26 DIAGNOSIS — E03.9 HYPOTHYROIDISM, UNSPECIFIED TYPE: ICD-10-CM

## 2024-11-26 DIAGNOSIS — E78.2 MIXED HYPERLIPIDEMIA: ICD-10-CM

## 2025-01-28 ENCOUNTER — APPOINTMENT (OUTPATIENT)
Dept: PRIMARY CARE | Facility: CLINIC | Age: 35
End: 2025-01-28
Payer: MEDICAID

## 2025-01-30 ENCOUNTER — APPOINTMENT (OUTPATIENT)
Dept: PRIMARY CARE | Facility: CLINIC | Age: 35
End: 2025-01-30
Payer: MEDICAID

## 2025-02-17 ENCOUNTER — APPOINTMENT (OUTPATIENT)
Dept: PRIMARY CARE | Facility: CLINIC | Age: 35
End: 2025-02-17
Payer: MEDICAID

## 2025-02-17 VITALS
SYSTOLIC BLOOD PRESSURE: 124 MMHG | HEART RATE: 101 BPM | WEIGHT: 293 LBS | OXYGEN SATURATION: 96 % | DIASTOLIC BLOOD PRESSURE: 84 MMHG | TEMPERATURE: 97.2 F | BODY MASS INDEX: 54.88 KG/M2

## 2025-02-17 DIAGNOSIS — R53.83 OTHER FATIGUE: ICD-10-CM

## 2025-02-17 DIAGNOSIS — R40.0 SOMNOLENCE: ICD-10-CM

## 2025-02-17 DIAGNOSIS — E55.9 VITAMIN D DEFICIENCY, UNSPECIFIED: ICD-10-CM

## 2025-02-17 DIAGNOSIS — Z00.00 ROUTINE GENERAL MEDICAL EXAMINATION AT A HEALTH CARE FACILITY: ICD-10-CM

## 2025-02-17 DIAGNOSIS — E03.9 HYPOTHYROIDISM, UNSPECIFIED TYPE: Primary | ICD-10-CM

## 2025-02-17 DIAGNOSIS — R21 RASH: ICD-10-CM

## 2025-02-17 DIAGNOSIS — E78.2 MIXED HYPERLIPIDEMIA: ICD-10-CM

## 2025-02-17 DIAGNOSIS — F33.0 MILD EPISODE OF RECURRENT MAJOR DEPRESSIVE DISORDER (CMS-HCC): ICD-10-CM

## 2025-02-17 PROCEDURE — 99395 PREV VISIT EST AGE 18-39: CPT | Performed by: PHYSICIAN ASSISTANT

## 2025-02-17 ASSESSMENT — PATIENT HEALTH QUESTIONNAIRE - PHQ9
SUM OF ALL RESPONSES TO PHQ9 QUESTIONS 1 AND 2: 0
1. LITTLE INTEREST OR PLEASURE IN DOING THINGS: NOT AT ALL
2. FEELING DOWN, DEPRESSED OR HOPELESS: NOT AT ALL

## 2025-02-17 ASSESSMENT — ENCOUNTER SYMPTOMS
NAUSEA: 0
DIARRHEA: 0
LIGHT-HEADEDNESS: 0
ARTHRALGIAS: 0
FREQUENCY: 0
COLOR CHANGE: 0
APPETITE CHANGE: 0
ABDOMINAL PAIN: 0
BLOOD IN STOOL: 0
BACK PAIN: 1
SHORTNESS OF BREATH: 0
TREMORS: 0
SLEEP DISTURBANCE: 0
NERVOUS/ANXIOUS: 0
DEPRESSED MOOD: 1
CONSTIPATION: 0
WHEEZING: 0
CHEST TIGHTNESS: 0
DIZZINESS: 0
ACTIVITY CHANGE: 0
PALPITATIONS: 0
DEPRESSION: 1

## 2025-02-17 ASSESSMENT — PAIN SCALES - GENERAL: PAINLEVEL_OUTOF10: 0-NO PAIN

## 2025-02-17 NOTE — PROGRESS NOTES
Subjective   Patient ID: Usha Stevens is a 34 y.o. female who presents for Annual Exam.    Hyperlipidemia  This is a chronic problem. The current episode started more than 1 year ago. The problem is controlled. Associated symptoms include myalgias. Pertinent negatives include no chest pain or shortness of breath.   Thyroid Problem  Presents for follow-up visit. Symptoms include depressed mood. Patient reports no anxiety, cold intolerance, constipation, diarrhea, heat intolerance, palpitations or tremors. Her past medical history is significant for hyperlipidemia.   Back Pain  This is a recurrent problem. The current episode started 1 to 4 weeks ago. The problem occurs every several days. The problem has been waxing and waning since onset. The pain is present in the lumbar spine. Pertinent negatives include no abdominal pain or chest pain.   Depression  Visit Type: follow-up  Patient presents with the following symptoms: depressed mood.  Patient is not experiencing: nervousness/anxiety, palpitations and shortness of breath.  Frequency of symptoms: occasionally   Severity: mild      Has a 13 year old daughter.  Wanting to work on weight loss. Has never seen a dietician. Never tried WW.  Sees .  Review of Systems   Constitutional:  Negative for activity change and appetite change.   HENT:  Negative for dental problem.    Eyes:  Negative for visual disturbance.   Respiratory:  Negative for chest tightness, shortness of breath and wheezing.    Cardiovascular:  Negative for chest pain, palpitations and leg swelling.   Gastrointestinal:  Negative for abdominal pain, blood in stool, constipation, diarrhea and nausea.   Endocrine: Negative for cold intolerance and heat intolerance.   Genitourinary:  Negative for frequency and urgency.   Musculoskeletal:  Positive for back pain and myalgias. Negative for arthralgias.   Skin:  Negative for color change.   Allergic/Immunologic: Negative for immunocompromised  state.   Neurological:  Negative for dizziness, tremors and light-headedness.   Psychiatric/Behavioral:  Positive for depression. Negative for behavioral problems, dysphoric mood and sleep disturbance. The patient is not nervous/anxious.        Objective   /84   Pulse 101   Temp 36.2 °C (97.2 °F) (Temporal)   Wt (!) 154 kg (340 lb)   LMP  (LMP Unknown)   SpO2 96%   BMI 54.88 kg/m²     Physical Exam  Constitutional:       Appearance: She is obese.   HENT:      Right Ear: Tympanic membrane normal.      Left Ear: Tympanic membrane normal.      Nose: Nose normal.      Mouth/Throat:      Mouth: Mucous membranes are moist.   Eyes:      Extraocular Movements: Extraocular movements intact.      Pupils: Pupils are equal, round, and reactive to light.   Neck:      Thyroid: No thyromegaly.   Cardiovascular:      Rate and Rhythm: Normal rate and regular rhythm.      Pulses: Normal pulses.   Pulmonary:      Effort: Pulmonary effort is normal. No respiratory distress.   Abdominal:      General: Bowel sounds are normal.      Tenderness: There is no abdominal tenderness.   Musculoskeletal:      Cervical back: Normal range of motion. No tenderness.      Lumbar back: Tenderness present. No spasms or bony tenderness. Normal range of motion. No scoliosis.      Right lower leg: No edema.      Left lower leg: No edema.   Skin:     General: Skin is warm.   Neurological:      General: No focal deficit present.      Mental Status: She is alert. Mental status is at baseline.   Psychiatric:         Mood and Affect: Mood normal.         Thought Content: Thought content normal.         Judgment: Judgment normal.         Assessment/Plan   Diagnoses and all orders for this visit:  Hypothyroidism, unspecified type  -     T3, Reverse; Future  -     T4, free; Future  -     T3, free; Future  -     Thyroid Peroxidase (TPO) Antibody; Future  -     CBC and Auto Differential; Future  -     Comprehensive Metabolic Panel; Future  -      Hemoglobin A1C; Future  -     Lipid Panel; Future  -     TSH with reflex to Free T4 if abnormal; Future  Routine general medical examination at a health care facility  -     CBC and Auto Differential; Future  -     Urinalysis with Reflex Microscopic; Future  Vitamin D deficiency, unspecified  Mixed hyperlipidemia  -     CBC and Auto Differential; Future  -     Comprehensive Metabolic Panel; Future  -     Hemoglobin A1C; Future  -     Lipid Panel; Future  Mild episode of recurrent major depressive disorder (CMS-HCC)  Somnolence  -     Home sleep apnea test (HSAT); Future  Other fatigue  -     Home sleep apnea test (HSAT); Future  Rash  -     Ferritin; Future  -     Iron and TIBC; Future  -     Iron level; Future  Increased fatigue especially when she gets home.  Is not aware if she snores.  Has does have interrupted sleep and does not feel rested when she gets up in the morning.  Will obtain a home sleep study test.  Has a discoloration of her lower ankles and feet region with brown pigmented areas.  Will check iron levels.

## 2025-02-20 ASSESSMENT — ENCOUNTER SYMPTOMS
DYSPHORIC MOOD: 0
MYALGIAS: 1

## 2025-02-23 LAB
ALBUMIN SERPL-MCNC: 4.2 G/DL (ref 3.6–5.1)
ALP SERPL-CCNC: 71 U/L (ref 31–125)
ALT SERPL-CCNC: 21 U/L (ref 6–29)
ANION GAP SERPL CALCULATED.4IONS-SCNC: 11 MMOL/L (CALC) (ref 7–17)
APPEARANCE UR: CLEAR
AST SERPL-CCNC: 25 U/L (ref 10–30)
BACTERIA #/AREA URNS HPF: ABNORMAL /HPF
BASOPHILS # BLD AUTO: 65 CELLS/UL (ref 0–200)
BASOPHILS NFR BLD AUTO: 0.5 %
BILIRUB SERPL-MCNC: 0.6 MG/DL (ref 0.2–1.2)
BILIRUB UR QL STRIP: NEGATIVE
BUN SERPL-MCNC: 12 MG/DL (ref 7–25)
CALCIUM SERPL-MCNC: 8.9 MG/DL (ref 8.6–10.2)
CHLORIDE SERPL-SCNC: 104 MMOL/L (ref 98–110)
CHOLEST SERPL-MCNC: 182 MG/DL
CHOLEST/HDLC SERPL: 4.8 (CALC)
CO2 SERPL-SCNC: 21 MMOL/L (ref 20–32)
COLOR UR: YELLOW
CREAT SERPL-MCNC: 0.72 MG/DL (ref 0.5–0.97)
EGFRCR SERPLBLD CKD-EPI 2021: 112 ML/MIN/1.73M2
EOSINOPHIL # BLD AUTO: 364 CELLS/UL (ref 15–500)
EOSINOPHIL NFR BLD AUTO: 2.8 %
ERYTHROCYTE [DISTWIDTH] IN BLOOD BY AUTOMATED COUNT: 13 % (ref 11–15)
EST. AVERAGE GLUCOSE BLD GHB EST-MCNC: 128 MG/DL
EST. AVERAGE GLUCOSE BLD GHB EST-SCNC: 7.1 MMOL/L
GLUCOSE SERPL-MCNC: 111 MG/DL (ref 65–99)
GLUCOSE UR QL STRIP: NEGATIVE
HBA1C MFR BLD: 6.1 % OF TOTAL HGB
HCT VFR BLD AUTO: 39.7 % (ref 35–45)
HDLC SERPL-MCNC: 38 MG/DL
HGB BLD-MCNC: 13.1 G/DL (ref 11.7–15.5)
HGB UR QL STRIP: NEGATIVE
HYALINE CASTS #/AREA URNS LPF: ABNORMAL /LPF
KETONES UR QL STRIP: NEGATIVE
LDLC SERPL CALC-MCNC: 114 MG/DL (CALC)
LEUKOCYTE ESTERASE UR QL STRIP: NEGATIVE
LYMPHOCYTES # BLD AUTO: 4251 CELLS/UL (ref 850–3900)
LYMPHOCYTES NFR BLD AUTO: 32.7 %
MCH RBC QN AUTO: 29 PG (ref 27–33)
MCHC RBC AUTO-ENTMCNC: 33 G/DL (ref 32–36)
MCV RBC AUTO: 87.8 FL (ref 80–100)
MONOCYTES # BLD AUTO: 624 CELLS/UL (ref 200–950)
MONOCYTES NFR BLD AUTO: 4.8 %
NEUTROPHILS # BLD AUTO: 7696 CELLS/UL (ref 1500–7800)
NEUTROPHILS NFR BLD AUTO: 59.2 %
NITRITE UR QL STRIP: NEGATIVE
NONHDLC SERPL-MCNC: 144 MG/DL (CALC)
PH UR STRIP: 7 [PH] (ref 5–8)
PLATELET # BLD AUTO: 309 THOUSAND/UL (ref 140–400)
PMV BLD REES-ECKER: 10.7 FL (ref 7.5–12.5)
POTASSIUM SERPL-SCNC: 4.5 MMOL/L (ref 3.5–5.3)
PROT SERPL-MCNC: 7.4 G/DL (ref 6.1–8.1)
PROT UR QL STRIP: ABNORMAL
RBC # BLD AUTO: 4.52 MILLION/UL (ref 3.8–5.1)
RBC #/AREA URNS HPF: ABNORMAL /HPF
SERVICE CMNT-IMP: ABNORMAL
SODIUM SERPL-SCNC: 136 MMOL/L (ref 135–146)
SP GR UR STRIP: 1.02 (ref 1–1.03)
SQUAMOUS #/AREA URNS HPF: ABNORMAL /HPF
T3FREE SERPL-MCNC: 2.8 PG/ML (ref 2.3–4.2)
T3REVERSE SERPL-MCNC: NORMAL PG/ML
T4 FREE SERPL-MCNC: 1.1 NG/DL (ref 0.8–1.8)
THYROPEROXIDASE AB SERPL-ACNC: NORMAL [IU]/ML
TRIGL SERPL-MCNC: 188 MG/DL
TSH SERPL-ACNC: 2.08 MIU/L
WBC # BLD AUTO: 13 THOUSAND/UL (ref 3.8–10.8)
WBC #/AREA URNS HPF: ABNORMAL /HPF

## 2025-02-27 LAB
T3FREE SERPL-MCNC: 2.8 PG/ML (ref 2.3–4.2)
T3REVERSE SERPL-MCNC: 18 NG/DL (ref 8–25)
T4 FREE SERPL-MCNC: 1.1 NG/DL (ref 0.8–1.8)
THYROPEROXIDASE AB SERPL-ACNC: 309 IU/ML

## 2025-03-02 ENCOUNTER — PROCEDURE VISIT (OUTPATIENT)
Dept: SLEEP MEDICINE | Facility: HOSPITAL | Age: 35
End: 2025-03-02
Payer: MEDICAID

## 2025-03-02 DIAGNOSIS — R40.0 SOMNOLENCE: ICD-10-CM

## 2025-03-02 DIAGNOSIS — R53.83 OTHER FATIGUE: ICD-10-CM

## 2025-03-02 PROCEDURE — 95806 SLEEP STUDY UNATT&RESP EFFT: CPT | Performed by: HOSPITALIST

## 2025-03-04 DIAGNOSIS — J30.81 ALLERGIC RHINITIS DUE TO ANIMAL DANDER: ICD-10-CM

## 2025-03-04 DIAGNOSIS — J30.1 SEASONAL ALLERGIC RHINITIS DUE TO POLLEN: ICD-10-CM

## 2025-03-04 DIAGNOSIS — J30.89 ALLERGIC RHINITIS DUE TO DUST MITE: ICD-10-CM

## 2025-03-04 DIAGNOSIS — T78.1XXD ADVERSE FOOD REACTION, SUBSEQUENT ENCOUNTER: ICD-10-CM

## 2025-03-05 RX ORDER — CETIRIZINE HYDROCHLORIDE 10 MG/1
10 TABLET ORAL DAILY PRN
Qty: 30 TABLET | Refills: 0 | Status: SHIPPED | OUTPATIENT
Start: 2025-03-05 | End: 2026-03-05

## 2025-03-17 ENCOUNTER — APPOINTMENT (OUTPATIENT)
Dept: PRIMARY CARE | Facility: CLINIC | Age: 35
End: 2025-03-17
Payer: MEDICAID

## 2025-03-17 VITALS
WEIGHT: 293 LBS | HEART RATE: 95 BPM | OXYGEN SATURATION: 95 % | DIASTOLIC BLOOD PRESSURE: 84 MMHG | BODY MASS INDEX: 54.88 KG/M2 | SYSTOLIC BLOOD PRESSURE: 112 MMHG

## 2025-03-17 DIAGNOSIS — F32.A DEPRESSION, UNSPECIFIED DEPRESSION TYPE: ICD-10-CM

## 2025-03-17 DIAGNOSIS — E78.2 MIXED HYPERLIPIDEMIA: ICD-10-CM

## 2025-03-17 DIAGNOSIS — E03.9 HYPOTHYROIDISM, UNSPECIFIED TYPE: ICD-10-CM

## 2025-03-17 PROCEDURE — 99213 OFFICE O/P EST LOW 20 MIN: CPT | Performed by: PHYSICIAN ASSISTANT

## 2025-03-17 RX ORDER — BUPROPION HYDROCHLORIDE 150 MG/1
150 TABLET ORAL DAILY
Qty: 90 TABLET | Refills: 0 | Status: SHIPPED | OUTPATIENT
Start: 2025-03-17 | End: 2025-09-13

## 2025-03-17 RX ORDER — ATORVASTATIN CALCIUM 10 MG/1
10 TABLET, FILM COATED ORAL NIGHTLY
Qty: 90 TABLET | Refills: 0 | Status: SHIPPED | OUTPATIENT
Start: 2025-03-17 | End: 2025-09-13

## 2025-03-17 RX ORDER — LEVOTHYROXINE SODIUM 50 UG/1
50 TABLET ORAL DAILY
Qty: 90 TABLET | Refills: 0 | Status: SHIPPED | OUTPATIENT
Start: 2025-03-17 | End: 2025-09-13

## 2025-03-17 ASSESSMENT — PAIN SCALES - GENERAL: PAINLEVEL_OUTOF10: 0-NO PAIN

## 2025-03-17 NOTE — PROGRESS NOTES
Subjective   Patient ID: Usha Stevens is a 34 y.o. female who presents for Follow-up (labs).    HPI   Patient is here for follow-up of blood work.  Her lipid panel was elevated.  A1c shows she is prediabetic at 6.1%.  Peroxidase antibody level was elevated at 309.   Discussed her sleep study results. She has moderate sleep apnea.  Review of Systems   Constitutional:  Positive for activity change and fatigue. Negative for appetite change.   HENT:  Negative for nosebleeds, sinus pressure and sinus pain.    Psychiatric/Behavioral:  Positive for sleep disturbance. The patient is not nervous/anxious.        Objective   /84   Pulse 95   Wt (!) 154 kg (340 lb)   LMP  (LMP Unknown)   SpO2 95%   BMI 54.88 kg/m²     Physical Exam  Constitutional:       Appearance: She is obese.   Neurological:      General: No focal deficit present.      Mental Status: She is alert and oriented to person, place, and time. Mental status is at baseline.   Psychiatric:         Mood and Affect: Mood normal.         Behavior: Behavior normal.         Thought Content: Thought content normal.         Judgment: Judgment normal.         Assessment/Plan   Diagnoses and all orders for this visit:  Mixed hyperlipidemia  -     atorvastatin (Lipitor) 10 mg tablet; Take 1 tablet (10 mg) by mouth once daily at bedtime.  Depression, unspecified depression type  -     buPROPion XL (Wellbutrin XL) 150 mg 24 hr tablet; Take 1 tablet (150 mg) by mouth once daily.  Hypothyroidism, unspecified type  -     levothyroxine (Synthroid, Levoxyl) 50 mcg tablet; Take 1 tablet (50 mcg) by mouth once daily.  She needed some refills. Continue working on improving diet and weight loss. Will send order for her CPAP machine as well.

## 2025-03-19 ASSESSMENT — ENCOUNTER SYMPTOMS
FATIGUE: 1
SINUS PAIN: 0
SLEEP DISTURBANCE: 1
APPETITE CHANGE: 0
NERVOUS/ANXIOUS: 0
SINUS PRESSURE: 0
ACTIVITY CHANGE: 1

## 2025-03-20 ENCOUNTER — PATIENT MESSAGE (OUTPATIENT)
Dept: PRIMARY CARE | Facility: CLINIC | Age: 35
End: 2025-03-20

## 2025-03-25 ENCOUNTER — APPOINTMENT (OUTPATIENT)
Dept: PRIMARY CARE | Facility: CLINIC | Age: 35
End: 2025-03-25
Payer: MEDICAID

## 2025-03-25 VITALS
HEART RATE: 77 BPM | BODY MASS INDEX: 54.88 KG/M2 | SYSTOLIC BLOOD PRESSURE: 114 MMHG | WEIGHT: 293 LBS | DIASTOLIC BLOOD PRESSURE: 68 MMHG | OXYGEN SATURATION: 95 %

## 2025-03-25 DIAGNOSIS — Z82.49 FAMILY HISTORY OF HEART DISEASE: ICD-10-CM

## 2025-03-25 DIAGNOSIS — R73.01 IMPAIRED FASTING GLUCOSE: Primary | ICD-10-CM

## 2025-03-25 PROCEDURE — 99213 OFFICE O/P EST LOW 20 MIN: CPT | Performed by: PHYSICIAN ASSISTANT

## 2025-03-25 RX ORDER — METFORMIN HYDROCHLORIDE 500 MG/1
500 TABLET, EXTENDED RELEASE ORAL
Qty: 90 TABLET | Refills: 1 | Status: SHIPPED | OUTPATIENT
Start: 2025-03-25

## 2025-03-25 ASSESSMENT — ENCOUNTER SYMPTOMS
DIZZINESS: 0
CHEST TIGHTNESS: 0
CONFUSION: 0
ACTIVITY CHANGE: 0
FEVER: 0
VOMITING: 0
NAUSEA: 0
LIGHT-HEADEDNESS: 0
CHOKING: 0
NERVOUS/ANXIOUS: 0
APPETITE CHANGE: 0

## 2025-03-25 ASSESSMENT — PAIN SCALES - GENERAL: PAINLEVEL_OUTOF10: 0-NO PAIN

## 2025-03-25 NOTE — PROGRESS NOTES
Subjective   Patient ID: Usha Stevens is a 34 y.o. female who presents for Follow-up (Discuss weight loss medication  ).    HPI   Pt is here to discuss weight loss medication. Pt's insurance will not cover GLP-1s. Pt states she is willing to try anything.     Review of Systems   Constitutional:  Negative for activity change, appetite change and fever.   Eyes:  Negative for visual disturbance.   Respiratory:  Negative for choking and chest tightness.    Cardiovascular:  Negative for chest pain and leg swelling.   Gastrointestinal:  Negative for nausea and vomiting.   Skin:  Negative for rash.   Neurological:  Negative for dizziness and light-headedness.   Psychiatric/Behavioral:  Negative for behavioral problems and confusion. The patient is not nervous/anxious.        Objective   /68 (BP Location: Left arm)   Pulse 77   Wt (!) 154 kg (340 lb)   SpO2 95%   BMI 54.88 kg/m²     Physical Exam  Vitals and nursing note reviewed.   Constitutional:       Appearance: Normal appearance. She is obese.   Skin:     General: Skin is warm and dry.   Neurological:      General: No focal deficit present.      Mental Status: She is alert and oriented to person, place, and time. Mental status is at baseline.   Psychiatric:         Mood and Affect: Mood normal.         Behavior: Behavior normal.         Thought Content: Thought content normal.     Assessment/Plan   Diagnoses and all orders for this visit:  Impaired fasting glucose  -     CT cardiac scoring wo IV contrast; Future             -     maternal side hx of dyslipidemia   -     metFORMIN XR (Glucophage-XR) 500 mg 24 hr tablet; Take 1 tablet (500 mg) by mouth once daily in the evening. Take with meals. Do not crush, chew, or split.        - discussed potential of low blood sugar symptoms when taking this medication. Pt was instructed to eat something if she feels dizzy, lightheaded, diaphoresis, or shaky.   Family history of heart disease  -     CT cardiac  scoring wo IV contrast; Future  - Discussed bloodwork and talked about eating a healthy diet  I was present with the medical student who participated in the documentation of this note. I have personally seen and examined the patient and performed the medical decision-making components. I have reviewed the medical student documentation and verified the findings in the note as written with additions or exceptions as stated in the body of the note.  Janice DUS

## 2025-03-26 ENCOUNTER — PATIENT MESSAGE (OUTPATIENT)
Dept: PRIMARY CARE | Facility: CLINIC | Age: 35
End: 2025-03-26

## 2025-03-28 ENCOUNTER — HOSPITAL ENCOUNTER (OUTPATIENT)
Dept: RADIOLOGY | Facility: HOSPITAL | Age: 35
Discharge: HOME | End: 2025-03-28
Payer: MEDICAID

## 2025-03-28 DIAGNOSIS — Z82.49 FAMILY HISTORY OF HEART DISEASE: ICD-10-CM

## 2025-03-28 DIAGNOSIS — R73.01 IMPAIRED FASTING GLUCOSE: ICD-10-CM

## 2025-03-28 PROCEDURE — 75571 CT HRT W/O DYE W/CA TEST: CPT

## 2025-04-06 DIAGNOSIS — J30.89 ALLERGIC RHINITIS DUE TO DUST MITE: ICD-10-CM

## 2025-04-06 DIAGNOSIS — T78.1XXD ADVERSE FOOD REACTION, SUBSEQUENT ENCOUNTER: ICD-10-CM

## 2025-04-06 DIAGNOSIS — J30.1 SEASONAL ALLERGIC RHINITIS DUE TO POLLEN: ICD-10-CM

## 2025-04-06 DIAGNOSIS — J30.81 ALLERGIC RHINITIS DUE TO ANIMAL DANDER: ICD-10-CM

## 2025-04-07 RX ORDER — CETIRIZINE HYDROCHLORIDE 10 MG/1
10 TABLET ORAL DAILY PRN
Qty: 30 TABLET | Refills: 0 | Status: SHIPPED | OUTPATIENT
Start: 2025-04-07 | End: 2026-04-07

## 2025-05-06 ENCOUNTER — APPOINTMENT (OUTPATIENT)
Dept: PRIMARY CARE | Facility: CLINIC | Age: 35
End: 2025-05-06
Payer: MEDICAID

## 2025-05-06 VITALS
WEIGHT: 293 LBS | SYSTOLIC BLOOD PRESSURE: 128 MMHG | OXYGEN SATURATION: 94 % | BODY MASS INDEX: 55.36 KG/M2 | DIASTOLIC BLOOD PRESSURE: 88 MMHG | HEART RATE: 80 BPM

## 2025-05-06 DIAGNOSIS — M25.561 CHRONIC PAIN OF RIGHT KNEE: Primary | ICD-10-CM

## 2025-05-06 DIAGNOSIS — R73.01 IMPAIRED FASTING GLUCOSE: ICD-10-CM

## 2025-05-06 DIAGNOSIS — G89.29 CHRONIC PAIN OF RIGHT KNEE: Primary | ICD-10-CM

## 2025-05-06 PROCEDURE — 99213 OFFICE O/P EST LOW 20 MIN: CPT | Performed by: PHYSICIAN ASSISTANT

## 2025-05-06 ASSESSMENT — PROMIS GLOBAL HEALTH SCALE
RATE_GENERAL_HEALTH: VERY GOOD
RATE_QUALITY_OF_LIFE: EXCELLENT
RATE_AVERAGE_PAIN: 5
CARRYOUT_PHYSICAL_ACTIVITIES: COMPLETELY
RATE_SOCIAL_SATISFACTION: EXCELLENT
RATE_MENTAL_HEALTH: VERY GOOD
RATE_PHYSICAL_HEALTH: GOOD
CARRYOUT_SOCIAL_ACTIVITIES: EXCELLENT
EMOTIONAL_PROBLEMS: SOMETIMES
RATE_AVERAGE_FATIGUE: MODERATE

## 2025-05-06 ASSESSMENT — PATIENT HEALTH QUESTIONNAIRE - PHQ9
SUM OF ALL RESPONSES TO PHQ9 QUESTIONS 1 AND 2: 0
2. FEELING DOWN, DEPRESSED OR HOPELESS: NOT AT ALL
1. LITTLE INTEREST OR PLEASURE IN DOING THINGS: NOT AT ALL

## 2025-05-06 ASSESSMENT — PAIN SCALES - GENERAL: PAINLEVEL_OUTOF10: 5

## 2025-05-06 NOTE — PROGRESS NOTES
Subjective   Patient ID: Usha Stevens is a 34 y.o. female who presents for Follow-up.    Knee Pain   The incident occurred more than 1 week ago. There was no injury mechanism. The pain is mild. The pain has been Fluctuating since onset. Pertinent negatives include no numbness or tingling.      Here for follow up on her Metformin. Not having any side effects.  Willing to try 2 pills a day.  Review of Systems   Gastrointestinal:  Negative for diarrhea, nausea and vomiting.   Musculoskeletal:  Positive for arthralgias, joint swelling and myalgias. Negative for gait problem.   Neurological:  Negative for tingling and numbness.       Objective   /88 (BP Location: Left arm)   Pulse 80   Wt (!) 156 kg (343 lb)   SpO2 94%   BMI 55.36 kg/m²     Physical Exam  Constitutional:       Appearance: She is obese.   Cardiovascular:      Rate and Rhythm: Normal rate and regular rhythm.   Pulmonary:      Effort: Pulmonary effort is normal.   Musculoskeletal:      Right knee: Swelling and effusion present. Normal range of motion. Tenderness present over the medial joint line and MCL.   Neurological:      General: No focal deficit present.      Mental Status: She is alert and oriented to person, place, and time.   Psychiatric:         Mood and Affect: Mood normal.         Behavior: Behavior normal.         Thought Content: Thought content normal.         Judgment: Judgment normal.         Assessment/Plan   Diagnoses and all orders for this visit:  Chronic pain of right knee  -     XR knee right 4+ views; Future  Impaired fasting glucose  -     Comprehensive Metabolic Panel; Future  Other orders  -     Follow Up In Primary Care - Established; Future  Suggested a knee sleeve and ice to her knee when needed.  Will see what her xray shows. Increase Metformin and recheck in 3 months.

## 2025-05-09 DIAGNOSIS — T78.1XXD ADVERSE FOOD REACTION, SUBSEQUENT ENCOUNTER: ICD-10-CM

## 2025-05-09 DIAGNOSIS — J30.81 ALLERGIC RHINITIS DUE TO ANIMAL DANDER: ICD-10-CM

## 2025-05-09 DIAGNOSIS — J30.1 SEASONAL ALLERGIC RHINITIS DUE TO POLLEN: ICD-10-CM

## 2025-05-09 DIAGNOSIS — J30.89 ALLERGIC RHINITIS DUE TO DUST MITE: ICD-10-CM

## 2025-05-09 RX ORDER — CETIRIZINE HYDROCHLORIDE 10 MG/1
10 TABLET ORAL DAILY PRN
Qty: 30 TABLET | Refills: 0 | Status: SHIPPED | OUTPATIENT
Start: 2025-05-09 | End: 2026-05-09

## 2025-05-11 ASSESSMENT — ENCOUNTER SYMPTOMS
DIARRHEA: 0
NAUSEA: 0
TINGLING: 0
NUMBNESS: 0
ARTHRALGIAS: 1
JOINT SWELLING: 1
VOMITING: 0
MYALGIAS: 1

## 2025-05-17 ENCOUNTER — HOSPITAL ENCOUNTER (OUTPATIENT)
Dept: RADIOLOGY | Facility: HOSPITAL | Age: 35
Discharge: HOME | End: 2025-05-17
Payer: MEDICAID

## 2025-05-17 DIAGNOSIS — G89.29 CHRONIC PAIN OF RIGHT KNEE: ICD-10-CM

## 2025-05-17 DIAGNOSIS — M25.561 CHRONIC PAIN OF RIGHT KNEE: ICD-10-CM

## 2025-05-17 PROCEDURE — 73562 X-RAY EXAM OF KNEE 3: CPT | Mod: RT

## 2025-05-17 PROCEDURE — 73562 X-RAY EXAM OF KNEE 3: CPT | Mod: RIGHT SIDE | Performed by: RADIOLOGY

## 2025-05-18 LAB
ALBUMIN SERPL-MCNC: 4.2 G/DL (ref 3.6–5.1)
ALP SERPL-CCNC: 78 U/L (ref 31–125)
ALT SERPL-CCNC: 23 U/L (ref 6–29)
ANION GAP SERPL CALCULATED.4IONS-SCNC: 14 MMOL/L (CALC) (ref 7–17)
AST SERPL-CCNC: 29 U/L (ref 10–30)
BILIRUB SERPL-MCNC: 0.5 MG/DL (ref 0.2–1.2)
BUN SERPL-MCNC: 12 MG/DL (ref 7–25)
CALCIUM SERPL-MCNC: 9.4 MG/DL (ref 8.6–10.2)
CHLORIDE SERPL-SCNC: 104 MMOL/L (ref 98–110)
CO2 SERPL-SCNC: 19 MMOL/L (ref 20–32)
CREAT SERPL-MCNC: 0.78 MG/DL (ref 0.5–0.97)
EGFRCR SERPLBLD CKD-EPI 2021: 102 ML/MIN/1.73M2
FERRITIN SERPL-MCNC: 121 NG/ML (ref 16–154)
GLUCOSE SERPL-MCNC: 98 MG/DL (ref 65–99)
IRON SATN MFR SERPL: 18 % (CALC) (ref 16–45)
IRON SERPL-MCNC: 58 MCG/DL (ref 40–190)
POTASSIUM SERPL-SCNC: 4.8 MMOL/L (ref 3.5–5.3)
PROT SERPL-MCNC: 7.3 G/DL (ref 6.1–8.1)
SODIUM SERPL-SCNC: 137 MMOL/L (ref 135–146)
TIBC SERPL-MCNC: 324 MCG/DL (CALC) (ref 250–450)

## 2025-05-20 DIAGNOSIS — R73.01 IMPAIRED FASTING GLUCOSE: ICD-10-CM

## 2025-06-02 ENCOUNTER — PATIENT MESSAGE (OUTPATIENT)
Dept: PRIMARY CARE | Facility: CLINIC | Age: 35
End: 2025-06-02
Payer: MEDICAID

## 2025-06-02 DIAGNOSIS — R73.01 IMPAIRED FASTING GLUCOSE: ICD-10-CM

## 2025-06-02 RX ORDER — METFORMIN HYDROCHLORIDE 500 MG/1
500 TABLET, EXTENDED RELEASE ORAL
Qty: 180 TABLET | Refills: 1 | Status: SHIPPED | OUTPATIENT
Start: 2025-06-02

## 2025-06-04 ENCOUNTER — OFFICE VISIT (OUTPATIENT)
Dept: ALLERGY | Facility: HOSPITAL | Age: 35
End: 2025-06-04
Payer: MEDICAID

## 2025-06-04 VITALS
WEIGHT: 293 LBS | HEART RATE: 85 BPM | SYSTOLIC BLOOD PRESSURE: 125 MMHG | BODY MASS INDEX: 56.35 KG/M2 | TEMPERATURE: 98.2 F | DIASTOLIC BLOOD PRESSURE: 87 MMHG

## 2025-06-04 DIAGNOSIS — J30.1 SEASONAL ALLERGIC RHINITIS DUE TO POLLEN: Primary | ICD-10-CM

## 2025-06-04 DIAGNOSIS — H10.13 ALLERGIC CONJUNCTIVITIS OF BOTH EYES: ICD-10-CM

## 2025-06-04 DIAGNOSIS — J30.89 ALLERGIC RHINITIS DUE TO DUST MITE: ICD-10-CM

## 2025-06-04 DIAGNOSIS — J30.81 ALLERGIC RHINITIS DUE TO ANIMAL DANDER: ICD-10-CM

## 2025-06-04 PROCEDURE — 99214 OFFICE O/P EST MOD 30 MIN: CPT | Performed by: ALLERGY & IMMUNOLOGY

## 2025-06-04 RX ORDER — FLUTICASONE PROPIONATE 50 MCG
2 SPRAY, SUSPENSION (ML) NASAL DAILY
Qty: 16 G | Refills: 11 | Status: SHIPPED | OUTPATIENT
Start: 2025-06-04 | End: 2026-06-04

## 2025-06-04 RX ORDER — OLOPATADINE HYDROCHLORIDE 2 MG/ML
1 SOLUTION OPHTHALMIC DAILY PRN
Qty: 2.5 ML | Refills: 11 | Status: SHIPPED | OUTPATIENT
Start: 2025-06-04 | End: 2026-06-04

## 2025-06-04 RX ORDER — CETIRIZINE HYDROCHLORIDE 10 MG/1
10 TABLET ORAL DAILY PRN
Qty: 30 TABLET | Refills: 11 | Status: SHIPPED | OUTPATIENT
Start: 2025-06-04 | End: 2026-06-04

## 2025-06-04 RX ORDER — AZELASTINE 1 MG/ML
2 SPRAY, METERED NASAL 2 TIMES DAILY
Qty: 30 ML | Refills: 11 | Status: SHIPPED | OUTPATIENT
Start: 2025-06-04

## 2025-06-04 ASSESSMENT — ENCOUNTER SYMPTOMS
RESPIRATORY NEGATIVE: 1
HEMATOLOGIC/LYMPHATIC NEGATIVE: 1
CONSTITUTIONAL NEGATIVE: 1
GASTROINTESTINAL NEGATIVE: 1
EYES NEGATIVE: 1
CARDIOVASCULAR NEGATIVE: 1
MUSCULOSKELETAL NEGATIVE: 1
ALLERGIC/IMMUNOLOGIC NEGATIVE: 1

## 2025-06-04 NOTE — PROGRESS NOTES
Usha Stevens presents for follow up.  She notes that her allergies have been flaring up since the beginning of the spring.  Itchy eyes are the most bothersome symptom.  She has 6 cats at home.  She keeps them out of the bedroom.  Since last visit, she has reintroduced shellfish with no problem.      Review of Systems   Constitutional: Negative.    HENT: Negative.     Eyes: Negative.    Respiratory: Negative.     Cardiovascular: Negative.    Gastrointestinal: Negative.    Musculoskeletal: Negative.    Skin: Negative.    Allergic/Immunologic: Negative.    Hematological: Negative.        Vital signs:  /87 (BP Location: Right arm, Patient Position: Sitting)   Pulse 85   Temp 36.8 °C (98.2 °F) (Axillary)   Wt (!) 158 kg (349 lb 1.6 oz)   BMI 56.35 kg/m²     Physical Exam:    GENERAL: Alert, oriented and in no acute distress.     HEENT: EYES: No conjunctival injection or cobblestoning. Nose: nasal turbinates mildly edematous and are not boggy.  There is no mucous stranding, polyps, or blood    noted. EARS: Tympanic membranes are clear. MOUTH: moist and pink with no exudates, ulcers, or thrush. NECK: is supple, without adenopathy.  No upper airway stridor noted.       HEART: regular rate and rhythm.       LUNGS: Clear to auscultation bilaterally. No wheezing, rhonchi or rales.        ABDOMEN: Positive bowel sounds, soft, nontender, nondistended.       EXTREMITIES: No clubbing or edema.        NEURO:  Normal affect.  Gait normal.      SKIN: No rash, hives, or angioedema noted    Impression:  1. Seasonal allergic rhinitis due to pollen    2. Allergic rhinitis due to dust mite    3. Allergic rhinitis due to animal dander    4. Allergic conjunctivitis of both eyes      Assessment and Plan:  Allergic rhinitis, seasonal and perennial: She was found to have significant sensitivity to tree, weed, dust mite, cat on aeroallergen skin prick testing 2/2024.  We discussed treatments for allergic rhinitis to include  avoidance, medication, and allergen immunotherapy.  Continue home allergen avoidance measures..  Recommend Flonase 2 sprays each nostril once daily, cetirizine 10 mg once daily as needed, and start Astelin 2 sprays each nostril up to twice daily.  She is interested in allergen immunotherapy.  We reviewed risks and benefits and she signed informed consent today.  Will check environmental allergen specific IgE panel to update current allergic sensitivities and pending results of labs will formulate allergen immunotherapy vials and contact her when they are ready for administration.      Plan for follow-up in 6 months or sooner if needed

## 2025-06-04 NOTE — PATIENT INSTRUCTIONS
It was nice to see you today      You may use Flonase 2 sprays each nostril once daily in the morning    Start Astelin (Azelastine) 2 sprays each nostril in the evening.  You may use this twice daily if helpful.  This medication tastes bitter and can make you sleepy    Technique for nasal spray administration:  First, look slightly down, breathe normally, you do not need to sniff while you spray.  To use the nose spray, place the tip in your nose with the opposite hand (left hand, right side of nose, right hand, left side of nose), and aim or point toward the outside of the nose.  Do not sniff or snort medication afterwards as this can cause most of the medication to be swallowed.  Rather, dab your nose with a tissue if any runs out.    You may use olopatadine 0.2% eyedrops 1 drop each eye daily as needed    Please have labs drawn. Please note that some labs will come back sooner than others.  We will contact you when all labs have returned so that we can discuss results.     Once allergy testing results return, we will send your allergy shot prescription    We would like to see you in follow up in 6 months or sooner if needed

## 2025-06-10 ENCOUNTER — TELEPHONE (OUTPATIENT)
Dept: PRIMARY CARE | Facility: CLINIC | Age: 35
End: 2025-06-10
Payer: MEDICAID

## 2025-06-10 DIAGNOSIS — R73.01 IMPAIRED FASTING GLUCOSE: ICD-10-CM

## 2025-06-10 RX ORDER — METFORMIN HYDROCHLORIDE 500 MG/1
1000 TABLET, EXTENDED RELEASE ORAL
Qty: 180 TABLET | Refills: 1 | Status: SHIPPED | OUTPATIENT
Start: 2025-06-10

## 2025-06-10 NOTE — TELEPHONE ENCOUNTER
Pt called per [facility] [phone] as of yesterday they do not have RX Metformin, told her per message was sent 6/2 confirmation was received

## 2025-06-30 DIAGNOSIS — F32.A DEPRESSION, UNSPECIFIED DEPRESSION TYPE: ICD-10-CM

## 2025-06-30 DIAGNOSIS — E78.2 MIXED HYPERLIPIDEMIA: ICD-10-CM

## 2025-06-30 DIAGNOSIS — E03.9 HYPOTHYROIDISM, UNSPECIFIED TYPE: ICD-10-CM

## 2025-06-30 RX ORDER — BUPROPION HYDROCHLORIDE 150 MG/1
150 TABLET ORAL DAILY
Qty: 90 TABLET | Refills: 1 | Status: SHIPPED | OUTPATIENT
Start: 2025-06-30 | End: 2025-12-27

## 2025-06-30 RX ORDER — LEVOTHYROXINE SODIUM 50 UG/1
50 TABLET ORAL DAILY
Qty: 90 TABLET | Refills: 1 | Status: SHIPPED | OUTPATIENT
Start: 2025-06-30 | End: 2025-12-27

## 2025-06-30 RX ORDER — ATORVASTATIN CALCIUM 10 MG/1
10 TABLET, FILM COATED ORAL NIGHTLY
Qty: 90 TABLET | Refills: 1 | Status: SHIPPED | OUTPATIENT
Start: 2025-06-30 | End: 2025-12-27

## 2025-07-21 ENCOUNTER — APPOINTMENT (OUTPATIENT)
Facility: CLINIC | Age: 35
End: 2025-07-21
Payer: MEDICAID

## 2025-07-27 ENCOUNTER — HOSPITAL ENCOUNTER (EMERGENCY)
Facility: HOSPITAL | Age: 35
Discharge: HOME | End: 2025-07-27
Attending: EMERGENCY MEDICINE
Payer: MEDICAID

## 2025-07-27 ENCOUNTER — APPOINTMENT (OUTPATIENT)
Dept: RADIOLOGY | Facility: HOSPITAL | Age: 35
End: 2025-07-27
Payer: MEDICAID

## 2025-07-27 VITALS
DIASTOLIC BLOOD PRESSURE: 81 MMHG | HEART RATE: 80 BPM | HEIGHT: 66 IN | BODY MASS INDEX: 47.09 KG/M2 | TEMPERATURE: 97.7 F | RESPIRATION RATE: 18 BRPM | WEIGHT: 293 LBS | SYSTOLIC BLOOD PRESSURE: 142 MMHG | OXYGEN SATURATION: 100 %

## 2025-07-27 DIAGNOSIS — I10 DIASTOLIC HYPERTENSION: ICD-10-CM

## 2025-07-27 DIAGNOSIS — S83.92XA SPRAIN OF LEFT KNEE, INITIAL ENCOUNTER: Primary | ICD-10-CM

## 2025-07-27 PROCEDURE — 2500000001 HC RX 250 WO HCPCS SELF ADMINISTERED DRUGS (ALT 637 FOR MEDICARE OP): Performed by: EMERGENCY MEDICINE

## 2025-07-27 PROCEDURE — 73562 X-RAY EXAM OF KNEE 3: CPT | Mod: LEFT SIDE | Performed by: RADIOLOGY

## 2025-07-27 PROCEDURE — 99283 EMERGENCY DEPT VISIT LOW MDM: CPT | Performed by: EMERGENCY MEDICINE

## 2025-07-27 PROCEDURE — 73562 X-RAY EXAM OF KNEE 3: CPT | Mod: LT

## 2025-07-27 RX ORDER — TRAMADOL HYDROCHLORIDE 50 MG/1
50 TABLET, FILM COATED ORAL EVERY 6 HOURS PRN
Qty: 12 TABLET | Refills: 0 | Status: SHIPPED | OUTPATIENT
Start: 2025-07-27 | End: 2025-08-01

## 2025-07-27 RX ORDER — ACETAMINOPHEN 325 MG/1
650 TABLET ORAL ONCE
Status: COMPLETED | OUTPATIENT
Start: 2025-07-27 | End: 2025-07-27

## 2025-07-27 RX ORDER — TRAMADOL HYDROCHLORIDE 50 MG/1
50 TABLET, FILM COATED ORAL EVERY 6 HOURS PRN
Status: DISCONTINUED | OUTPATIENT
Start: 2025-07-27 | End: 2025-07-27 | Stop reason: HOSPADM

## 2025-07-27 RX ORDER — NAPROXEN 500 MG/1
500 TABLET ORAL
Qty: 14 TABLET | Refills: 0 | Status: SHIPPED | OUTPATIENT
Start: 2025-07-27 | End: 2025-08-03

## 2025-07-27 RX ADMIN — TRAMADOL HYDROCHLORIDE 50 MG: 50 TABLET, COATED ORAL at 14:42

## 2025-07-27 RX ADMIN — ACETAMINOPHEN 650 MG: 325 TABLET ORAL at 14:42

## 2025-07-27 NOTE — Clinical Note
Usha Stevens was seen and treated in our emergency department on 7/27/2025.  She may return to work on 07/28/2025.  No prolonged standing or walking     If you have any questions or concerns, please don't hesitate to call.      Betty Lyn MD

## 2025-07-27 NOTE — PROGRESS NOTES
Attestation/Supervisory note for DEREK Nguyen      The patient is a 34-year-old female presenting to the emergency department for evaluation of left knee pain.  The patient states that she shifted in bed several days ago and felt pain in her left knee.  She states that she has been trying to take ibuprofen at home for symptom relief but still has pain when she is walking, standing or bending her knee.  No other injury or trauma.  No headache or visual changes.  No chest pain or shortness of breath.  No abdominal pain.  No nausea vomiting.  No diarrhea or constipation.  No urinary complaints.  No focal weakness or numbness.  All pertinent positives and negatives are recorded above.  All other systems reviewed and otherwise negative.  Vital signs with mild diastolic hypertension but otherwise within normal limits.  Physical exam with a well-nourished well-developed female in no acute distress.  HEENT exam within normal limits.  She has no evidence of airway compromise or respiratory distress.  Abdominal exam is benign.  She does not have any gross motor, neurologic or vascular deficits on exam but she does have pain with palpation and range of motion of the left knee.  No visible or palpable bony deformity.  No joint laxity.        Oral acetaminophen and oral Ultram ordered for symptom relief      XR knee left 3 views   Final Result   No osseous injury is evident.        MACRO:   None        Signed by: Terry Sharma 7/27/2025 3:38 PM   Dictation workstation:   [id number]           The patient does not have any visible or palpable bony deformity exam.  She does not have any evidence of joint laxity.  There is no evidence of fracture or dislocation on diagnostic imaging.  She does not have any gross motor, neurologic or vascular deficits on exam.  She does report some improvement in her symptoms with medications given in the emergency department.  A knee immobilizer was applied by nursing staff.  The patient was  neurovascular intact after it was applied.  The patient declined crutches.      The patient was released in good condition.  She will follow-up with her primary care physician within 1 to 2 days for further management of her current symptoms and repeat check of her blood pressure.  She will also discuss a possible referral to physical therapy.  She was also given a referral to orthopedics.  She will return to the emergency department sooner with worsening symptoms or onset of new symptoms.  She was given a prescription for ibuprofen and a limited prescription for Ultram for breakthrough pain.      Impression/diagnosis:  1.  Left knee sprain  2.  Diastolic hypertension      I personally saw the patient and made/approve the management plan and take responsibility for the patient management.      I personally discussed the patient's management with the patient      I reviewed the results of the diagnostic imaging.  Formal radiology read was completed by the radiologist.      Betty Lyn MD

## 2025-07-27 NOTE — DISCHARGE INSTRUCTIONS
Follow-up with your primary care physician within 1 to 2 days for further management of your current symptoms, repeat check of your blood pressure, and to discuss a possible referral to physical therapy.    Follow-up with orthopedics for further management of your current symptoms    Do not drive within 8 hours of last dose of pain medication    Return to the emergency department sooner with worsening of symptoms or onset of new symptoms

## 2025-07-27 NOTE — ED PROVIDER NOTES
HPI   Chief Complaint   Patient presents with    Knee Pain       34-year-old female presented emergency department with a chief complaint of pain in her left knee.  It started several days ago when she was crawling on her bed.  She is well-appearing nontoxic afebrile.  Denies numbness weakness.  Denies any other injury or trauma.  Been taking ibuprofen with minimal relief.  No other complaint.              Patient History   Medical History[1]  Surgical History[2]  Family History[3]  Social History[4]    Physical Exam   ED Triage Vitals [07/27/25 1425]   Temperature Heart Rate Respirations BP   36.5 °C (97.7 °F) 80 18 142/81      Pulse Ox Temp Source Heart Rate Source Patient Position   100 % Tympanic Monitor Sitting      BP Location FiO2 (%)     Left arm --       Physical Exam  Vitals and nursing note reviewed.   Constitutional:       Appearance: Normal appearance.   HENT:      Head: Normocephalic.      Nose: Nose normal.      Mouth/Throat:      Mouth: Mucous membranes are moist.     Cardiovascular:      Rate and Rhythm: Normal rate and regular rhythm.      Pulses: Normal pulses.      Heart sounds: Normal heart sounds.   Pulmonary:      Effort: Pulmonary effort is normal.      Breath sounds: Normal breath sounds.   Abdominal:      General: Abdomen is flat.     Musculoskeletal:         General: Normal range of motion.      Cervical back: Normal range of motion.      Comments: Tenderness to the anterior knee, able to flex and extend     Skin:     General: Skin is warm.     Neurological:      General: No focal deficit present.      Mental Status: She is alert and oriented to person, place, and time.     Psychiatric:         Mood and Affect: Mood normal.         Behavior: Behavior normal.           ED Course & MDM   Diagnoses as of 07/27/25 1546   Sprain of left knee, initial encounter   Diastolic hypertension                 No data recorded     [place] Coma Scale Score: 15 (07/27/25 1427 : Dariana Birch RN)                            Medical Decision Making  I have seen and evaluated this patient.  The attending physician has also seen and evaluated this patient.  Vital signs, laboratory testing and diagnostic images if applicable have been reviewed.  All laboratory and imaging is interpreted by myself unless otherwise stated.  Radiology studies are also formally interpreted by radiologist.     X-ray negative.  Given knee immobilizer, crutches.  Splint was applied by ancillary staff. The splint was checked by myself and patient is appropriately immobilized and maintains neurovascularly intact at this point in time.  Outpatient orthopedic follow-up, pain control medication.  Danielle stable condition from emergent standpoint with outpatient follow-up.    Labs Reviewed - No data to display  XR knee left 3 views   Final Result    No osseous injury is evident.          MACRO:    None          Signed by: Terry Sharma 7/27/2025 3:38 PM    Dictation workstation:   [id number]     Medications  traMADol (Ultram) tablet 50 mg (50 mg oral Given 7/27/25 1442)  acetaminophen (Tylenol) tablet 650 mg (650 mg oral Given 7/27/25 1442)  New Prescriptions  NAPROXEN (NAPROSYN) 500 MG TABLET     Take 1 tablet (500 mg) by mouth 2 times daily (morning and late afternoon) for 7 days.  TRAMADOL (ULTRAM) 50 MG TABLET     Take 1 tablet (50 mg) by mouth every 6 hours if needed for severe pain (7 - 10) for up to 3 days.            Procedure  Procedures       [1]   Past Medical History:  Diagnosis Date    Allergic 03/2024    Anxiety     Asthma     Depression     Disease of thyroid gland     Headache     Hyperlipidemia    [2] No past surgical history on file.  [3]   Family History  Problem Relation Name Age of Onset    Thyroid disease Mother Nusrat Crane     Hyperlipidemia Mother Nusrat Crane     Anesthesia related problems Mother Nusrat Crane     Diabetes Paternal Grandfather      Heart disease Maternal Grandmother Daphney Ramirez    [4]   Social  History  Tobacco Use    Smoking status: Never    Smokeless tobacco: Never   Vaping Use    Vaping status: Some Days   Substance Use Topics    Alcohol use: Yes    Drug use: Not Currently     Types: Marijuana        Chauncey Nguyen PA-C  07/27/25 154

## 2025-08-07 ENCOUNTER — APPOINTMENT (OUTPATIENT)
Dept: PRIMARY CARE | Facility: CLINIC | Age: 35
End: 2025-08-07
Payer: MEDICAID

## 2025-08-07 VITALS
BODY MASS INDEX: 47.09 KG/M2 | OXYGEN SATURATION: 99 % | SYSTOLIC BLOOD PRESSURE: 124 MMHG | DIASTOLIC BLOOD PRESSURE: 88 MMHG | HEIGHT: 66 IN | HEART RATE: 84 BPM | TEMPERATURE: 97.4 F | WEIGHT: 293 LBS

## 2025-08-07 DIAGNOSIS — E66.813 CLASS 3 SEVERE OBESITY DUE TO EXCESS CALORIES WITHOUT SERIOUS COMORBIDITY WITH BODY MASS INDEX (BMI) OF 50.0 TO 59.9 IN ADULT: ICD-10-CM

## 2025-08-07 DIAGNOSIS — R73.01 IMPAIRED FASTING GLUCOSE: Primary | ICD-10-CM

## 2025-08-07 PROCEDURE — 3008F BODY MASS INDEX DOCD: CPT | Performed by: PHYSICIAN ASSISTANT

## 2025-08-07 PROCEDURE — 1036F TOBACCO NON-USER: CPT | Performed by: PHYSICIAN ASSISTANT

## 2025-08-07 PROCEDURE — 99213 OFFICE O/P EST LOW 20 MIN: CPT | Performed by: PHYSICIAN ASSISTANT

## 2025-08-07 ASSESSMENT — COLUMBIA-SUICIDE SEVERITY RATING SCALE - C-SSRS
6. HAVE YOU EVER DONE ANYTHING, STARTED TO DO ANYTHING, OR PREPARED TO DO ANYTHING TO END YOUR LIFE?: NO
1. IN THE PAST MONTH, HAVE YOU WISHED YOU WERE DEAD OR WISHED YOU COULD GO TO SLEEP AND NOT WAKE UP?: NO
2. HAVE YOU ACTUALLY HAD ANY THOUGHTS OF KILLING YOURSELF?: NO

## 2025-08-07 ASSESSMENT — ENCOUNTER SYMPTOMS
APPETITE CHANGE: 1
ACTIVITY CHANGE: 1
ARTHRALGIAS: 1
MYALGIAS: 1

## 2025-08-07 NOTE — PROGRESS NOTES
"Subjective   Patient ID: Usha Stevens is a 34 y.o. female who presents for Weight Check (Left knee question  / update).    HPI   Patient is here for recheck of her weight and follow-up of an ER visit for knee pain.  She had an x-ray done which was negative and given a knee immobilizer and crutches.   States her knee is better.  Has noticed a change in her pant size and bra size.  Review of Systems   Constitutional:  Positive for activity change and appetite change.   Musculoskeletal:  Positive for arthralgias and myalgias.       Objective   /88 (BP Location: Left arm, Patient Position: Sitting, BP Cuff Size: Adult)   Pulse 84   Temp 36.3 °C (97.4 °F) (Temporal)   Ht 1.676 m (5' 6\")   Wt (!) 154 kg (340 lb)   SpO2 99%   BMI 54.88 kg/m²     Physical Exam  Constitutional:       Appearance: She is obese.     Cardiovascular:      Rate and Rhythm: Normal rate and regular rhythm.   Pulmonary:      Effort: Pulmonary effort is normal.     Neurological:      General: No focal deficit present.      Mental Status: She is alert and oriented to person, place, and time. Mental status is at baseline.     Psychiatric:         Mood and Affect: Mood normal.         Behavior: Behavior normal.         Thought Content: Thought content normal.         Judgment: Judgment normal.         Assessment/Plan   Diagnoses and all orders for this visit:  Impaired fasting glucose  -     [facility] INSULIN; Future  -     Hemoglobin A1C; Future  -     Comprehensive Metabolic Panel; Future  Class 3 severe obesity due to excess calories without serious comorbidity with body mass index (BMI) of 50.0 to 59.9 in adult  Other orders  -     Follow Up In Primary Care - Established  -     Follow Up In Primary Care - Established; Future  Recheck  in 3 months.     "

## 2025-08-12 ENCOUNTER — APPOINTMENT (OUTPATIENT)
Facility: CLINIC | Age: 35
End: 2025-08-12
Payer: MEDICAID

## 2025-08-12 VITALS
HEIGHT: 66 IN | SYSTOLIC BLOOD PRESSURE: 121 MMHG | WEIGHT: 293 LBS | DIASTOLIC BLOOD PRESSURE: 100 MMHG | BODY MASS INDEX: 47.09 KG/M2

## 2025-08-12 DIAGNOSIS — Z01.419 WELL WOMAN EXAM WITH ROUTINE GYNECOLOGICAL EXAM: Primary | ICD-10-CM

## 2025-08-12 DIAGNOSIS — Z30.431 IUD CHECK UP: ICD-10-CM

## 2025-08-12 LAB
ALBUMIN SERPL-MCNC: 4.2 G/DL (ref 3.6–5.1)
ALP SERPL-CCNC: 69 U/L (ref 31–125)
ALT SERPL-CCNC: 30 U/L (ref 6–29)
ANION GAP SERPL CALCULATED.4IONS-SCNC: 13 MMOL/L (CALC) (ref 7–17)
AST SERPL-CCNC: 32 U/L (ref 10–30)
BILIRUB SERPL-MCNC: 0.5 MG/DL (ref 0.2–1.2)
BUN SERPL-MCNC: 13 MG/DL (ref 7–25)
CALCIUM SERPL-MCNC: 9 MG/DL (ref 8.6–10.2)
CHLORIDE SERPL-SCNC: 103 MMOL/L (ref 98–110)
CO2 SERPL-SCNC: 21 MMOL/L (ref 20–32)
CREAT SERPL-MCNC: 0.79 MG/DL (ref 0.5–0.97)
EGFRCR SERPLBLD CKD-EPI 2021: 100 ML/MIN/1.73M2
EST. AVERAGE GLUCOSE BLD GHB EST-MCNC: 126 MG/DL
EST. AVERAGE GLUCOSE BLD GHB EST-SCNC: 7 MMOL/L
GLUCOSE SERPL-MCNC: 84 MG/DL (ref 65–139)
HBA1C MFR BLD: 6 %
INSULIN SERPL-ACNC: 37.7 UIU/ML
POTASSIUM SERPL-SCNC: 4.1 MMOL/L (ref 3.5–5.3)
PROT SERPL-MCNC: 7.3 G/DL (ref 6.1–8.1)
SODIUM SERPL-SCNC: 137 MMOL/L (ref 135–146)

## 2025-08-12 PROCEDURE — 3008F BODY MASS INDEX DOCD: CPT | Performed by: OBSTETRICS & GYNECOLOGY

## 2025-08-12 PROCEDURE — 99395 PREV VISIT EST AGE 18-39: CPT | Performed by: OBSTETRICS & GYNECOLOGY

## 2025-08-12 PROCEDURE — 99213 OFFICE O/P EST LOW 20 MIN: CPT

## 2025-08-12 ASSESSMENT — ENCOUNTER SYMPTOMS
LOSS OF SENSATION IN FEET: 0
OCCASIONAL FEELINGS OF UNSTEADINESS: 0
DEPRESSION: 0

## 2025-08-12 ASSESSMENT — PAIN SCALES - GENERAL: PAINLEVEL_OUTOF10: 0-NO PAIN

## 2025-08-16 ENCOUNTER — PATIENT MESSAGE (OUTPATIENT)
Dept: ALLERGY | Facility: HOSPITAL | Age: 35
End: 2025-08-16
Payer: MEDICAID

## 2025-08-16 DIAGNOSIS — J30.81 ALLERGIC RHINITIS DUE TO ANIMAL DANDER: ICD-10-CM

## 2025-08-16 DIAGNOSIS — T78.1XXD ADVERSE FOOD REACTION, SUBSEQUENT ENCOUNTER: ICD-10-CM

## 2025-08-16 DIAGNOSIS — J30.89 ALLERGIC RHINITIS DUE TO DUST MITE: ICD-10-CM

## 2025-08-16 DIAGNOSIS — J30.1 SEASONAL ALLERGIC RHINITIS DUE TO POLLEN: ICD-10-CM

## 2025-08-18 RX ORDER — CETIRIZINE HYDROCHLORIDE 10 MG/1
10 TABLET ORAL DAILY PRN
Qty: 30 TABLET | Refills: 4 | Status: SHIPPED | OUTPATIENT
Start: 2025-08-18 | End: 2026-08-18

## 2025-09-03 ENCOUNTER — APPOINTMENT (OUTPATIENT)
Dept: ALLERGY | Facility: HOSPITAL | Age: 35
End: 2025-09-03
Payer: MEDICAID

## 2025-09-11 ENCOUNTER — APPOINTMENT (OUTPATIENT)
Dept: PRIMARY CARE | Facility: CLINIC | Age: 35
End: 2025-09-11
Payer: MEDICAID

## 2025-11-11 ENCOUNTER — APPOINTMENT (OUTPATIENT)
Dept: PRIMARY CARE | Facility: CLINIC | Age: 35
End: 2025-11-11
Payer: MEDICAID